# Patient Record
Sex: MALE | Race: BLACK OR AFRICAN AMERICAN | Employment: OTHER | URBAN - METROPOLITAN AREA
[De-identification: names, ages, dates, MRNs, and addresses within clinical notes are randomized per-mention and may not be internally consistent; named-entity substitution may affect disease eponyms.]

---

## 2023-02-21 ENCOUNTER — APPOINTMENT (OUTPATIENT)
Facility: HOSPITAL | Age: 73
DRG: 066 | End: 2023-02-21
Payer: MEDICARE

## 2023-02-21 ENCOUNTER — HOSPITAL ENCOUNTER (INPATIENT)
Facility: HOSPITAL | Age: 73
LOS: 1 days | Discharge: HOME HEALTH | DRG: 066 | End: 2023-02-23
Attending: EMERGENCY MEDICINE | Admitting: INTERNAL MEDICINE
Payer: MEDICARE

## 2023-02-21 DIAGNOSIS — G45.9 TIA (TRANSIENT ISCHEMIC ATTACK): Primary | ICD-10-CM

## 2023-02-21 LAB
ALBUMIN SERPL-MCNC: 3.8 G/DL (ref 3.4–5)
ALBUMIN/GLOB SERPL: 1.2 (ref 0.8–1.7)
ALP SERPL-CCNC: 63 U/L (ref 45–117)
ALT SERPL-CCNC: 23 U/L (ref 16–61)
ANION GAP SERPL CALC-SCNC: 4 MMOL/L (ref 3–18)
AST SERPL-CCNC: 19 U/L (ref 10–38)
BASOPHILS # BLD: 0.1 K/UL (ref 0–0.1)
BASOPHILS NFR BLD: 1 % (ref 0–2)
BILIRUB SERPL-MCNC: 0.9 MG/DL (ref 0.2–1)
BUN SERPL-MCNC: 19 MG/DL (ref 7–18)
BUN/CREAT SERPL: 13 (ref 12–20)
CALCIUM SERPL-MCNC: 9.4 MG/DL (ref 8.5–10.1)
CHLORIDE SERPL-SCNC: 111 MMOL/L (ref 100–111)
CHOLEST SERPL-MCNC: 267 MG/DL
CK SERPL-CCNC: 273 U/L (ref 39–308)
CO2 SERPL-SCNC: 26 MMOL/L (ref 21–32)
CREAT SERPL-MCNC: 1.45 MG/DL (ref 0.6–1.3)
DIFFERENTIAL METHOD BLD: NORMAL
ECHO AO ARCH DIAM: 3.3 CM
ECHO AO ASC DIAM: 3.8 CM
ECHO AO ASCENDING AORTA INDEX: 1.88 CM/M2
ECHO AO ROOT DIAM: 3.9 CM
ECHO AO ROOT INDEX: 1.93 CM/M2
ECHO AV AREA PEAK VELOCITY: 3.6 CM2
ECHO AV AREA VTI: 3.8 CM2
ECHO AV AREA/BSA PEAK VELOCITY: 1.8 CM2/M2
ECHO AV AREA/BSA VTI: 1.9 CM2/M2
ECHO AV MEAN GRADIENT: 2 MMHG
ECHO AV MEAN VELOCITY: 0.7 M/S
ECHO AV PEAK GRADIENT: 4 MMHG
ECHO AV PEAK VELOCITY: 1 M/S
ECHO AV VELOCITY RATIO: 0.9
ECHO AV VTI: 21.8 CM
ECHO BSA: 2.04 M2
ECHO EST RA PRESSURE: 3 MMHG
ECHO LA VOL 2C: 63 ML (ref 18–58)
ECHO LA VOL 2C: 68 ML (ref 18–58)
ECHO LA VOL 4C: 46 ML (ref 18–58)
ECHO LA VOL 4C: 53 ML (ref 18–58)
ECHO LA VOLUME AREA LENGTH: 66 ML
ECHO LA VOLUME INDEX AREA LENGTH: 33 ML/M2 (ref 16–34)
ECHO LV E' LATERAL VELOCITY: 9 CM/S
ECHO LV E' SEPTAL VELOCITY: 6 CM/S
ECHO LV EJECTION FRACTION A2C: 60 %
ECHO LV EJECTION FRACTION A4C: 65 %
ECHO LV FRACTIONAL SHORTENING: 34 % (ref 28–44)
ECHO LV INTERNAL DIMENSION DIASTOLE INDEX: 1.88 CM/M2
ECHO LV INTERNAL DIMENSION DIASTOLIC: 3.8 CM (ref 4.2–5.9)
ECHO LV INTERNAL DIMENSION SYSTOLIC INDEX: 1.24 CM/M2
ECHO LV INTERNAL DIMENSION SYSTOLIC: 2.5 CM
ECHO LV IVSD: 1.4 CM (ref 0.6–1)
ECHO LV MASS 2D: 143.8 G (ref 88–224)
ECHO LV MASS INDEX 2D: 71.2 G/M2 (ref 49–115)
ECHO LV POSTERIOR WALL DIASTOLIC: 0.9 CM (ref 0.6–1)
ECHO LV RELATIVE WALL THICKNESS RATIO: 0.47
ECHO LVOT AREA: 4.2 CM2
ECHO LVOT AV VTI INDEX: 0.88
ECHO LVOT DIAM: 2.3 CM
ECHO LVOT MEAN GRADIENT: 2 MMHG
ECHO LVOT PEAK GRADIENT: 3 MMHG
ECHO LVOT PEAK VELOCITY: 0.9 M/S
ECHO LVOT STROKE VOLUME INDEX: 39.3 ML/M2
ECHO LVOT SV: 79.3 ML
ECHO LVOT VTI: 19.1 CM
ECHO MV A VELOCITY: 0.76 M/S
ECHO MV E DECELERATION TIME (DT): 420.4 MS
ECHO MV E VELOCITY: 0.61 M/S
ECHO MV E/A RATIO: 0.8
ECHO MV E/E' LATERAL: 6.78
ECHO MV E/E' RATIO (AVERAGED): 8.47
ECHO MV E/E' SEPTAL: 10.17
ECHO PV MAX VELOCITY: 0.9 M/S
ECHO PV PEAK GRADIENT: 4 MMHG
ECHO RA AREA 4C: 14.8 CM2
ECHO RIGHT VENTRICULAR SYSTOLIC PRESSURE (RVSP): 21 MMHG
ECHO RV BASAL DIMENSION: 3.3 CM
ECHO RV FREE WALL PEAK S': 13 CM/S
ECHO RV TAPSE: 2.5 CM (ref 1.7–?)
ECHO TV REGURGITANT MAX VELOCITY: 2.14 M/S
ECHO TV REGURGITANT PEAK GRADIENT: 18 MMHG
EKG ATRIAL RATE: 71 BPM
EKG DIAGNOSIS: NORMAL
EKG P AXIS: 42 DEGREES
EKG P-R INTERVAL: 178 MS
EKG Q-T INTERVAL: 412 MS
EKG QRS DURATION: 86 MS
EKG QTC CALCULATION (BAZETT): 447 MS
EKG R AXIS: 4 DEGREES
EKG T AXIS: 10 DEGREES
EKG VENTRICULAR RATE: 71 BPM
EOSINOPHIL # BLD: 0.1 K/UL (ref 0–0.4)
EOSINOPHIL NFR BLD: 1 % (ref 0–5)
ERYTHROCYTE [DISTWIDTH] IN BLOOD BY AUTOMATED COUNT: 14.2 % (ref 11.6–14.5)
ERYTHROCYTE [SEDIMENTATION RATE] IN BLOOD: 5 MM/HR (ref 0–20)
EST. AVERAGE GLUCOSE BLD GHB EST-MCNC: 131 MG/DL
GLOBULIN SER CALC-MCNC: 3.2 G/DL (ref 2–4)
GLUCOSE BLD STRIP.AUTO-MCNC: 97 MG/DL (ref 70–110)
GLUCOSE SERPL-MCNC: 148 MG/DL (ref 74–99)
HBA1C MFR BLD: 6.2 % (ref 4.2–5.6)
HCT VFR BLD AUTO: 44 % (ref 36–48)
HDLC SERPL-MCNC: 64 MG/DL (ref 40–60)
HDLC SERPL: 4.2 (ref 0–5)
HGB BLD-MCNC: 14.1 G/DL (ref 13–16)
IMM GRANULOCYTES # BLD AUTO: 0 K/UL (ref 0–0.04)
IMM GRANULOCYTES NFR BLD AUTO: 0 % (ref 0–0.5)
INR PPP: 1 (ref 0.8–1.2)
LDLC SERPL CALC-MCNC: 181.2 MG/DL (ref 0–100)
LIPID PANEL: ABNORMAL
LV EF: 63 %
LVEF MODALITY: ABNORMAL
LYMPHOCYTES # BLD: 2.2 K/UL (ref 0.9–3.6)
LYMPHOCYTES NFR BLD: 25 % (ref 21–52)
MCH RBC QN AUTO: 27 PG (ref 24–34)
MCHC RBC AUTO-ENTMCNC: 32 G/DL (ref 31–37)
MCV RBC AUTO: 84.3 FL (ref 78–100)
MONOCYTES # BLD: 0.5 K/UL (ref 0.05–1.2)
MONOCYTES NFR BLD: 6 % (ref 3–10)
NEUTS SEG # BLD: 6.1 K/UL (ref 1.8–8)
NEUTS SEG NFR BLD: 67 % (ref 40–73)
NRBC # BLD: 0 K/UL (ref 0–0.01)
NRBC BLD-RTO: 0 PER 100 WBC
PLATELET # BLD AUTO: 246 K/UL (ref 135–420)
PMV BLD AUTO: 10.1 FL (ref 9.2–11.8)
POTASSIUM SERPL-SCNC: 4.1 MMOL/L (ref 3.5–5.5)
PROT SERPL-MCNC: 7 G/DL (ref 6.4–8.2)
PROTHROMBIN TIME: 13.1 SEC (ref 11.5–15.2)
RBC # BLD AUTO: 5.22 M/UL (ref 4.35–5.65)
SODIUM SERPL-SCNC: 141 MMOL/L (ref 136–145)
TRIGL SERPL-MCNC: 109 MG/DL
TROPONIN I SERPL HS-MCNC: 10 NG/L (ref 0–78)
TSH SERPL DL<=0.05 MIU/L-ACNC: 0.53 UIU/ML (ref 0.36–3.74)
VLDLC SERPL CALC-MCNC: 21.8 MG/DL
WBC # BLD AUTO: 9.1 K/UL (ref 4.6–13.2)

## 2023-02-21 PROCEDURE — 2580000003 HC RX 258: Performed by: INTERNAL MEDICINE

## 2023-02-21 PROCEDURE — 99285 EMERGENCY DEPT VISIT HI MDM: CPT

## 2023-02-21 PROCEDURE — 93306 TTE W/DOPPLER COMPLETE: CPT | Performed by: INTERNAL MEDICINE

## 2023-02-21 PROCEDURE — 85652 RBC SED RATE AUTOMATED: CPT

## 2023-02-21 PROCEDURE — 6360000004 HC RX CONTRAST MEDICATION: Performed by: EMERGENCY MEDICINE

## 2023-02-21 PROCEDURE — 6360000002 HC RX W HCPCS

## 2023-02-21 PROCEDURE — 70551 MRI BRAIN STEM W/O DYE: CPT

## 2023-02-21 PROCEDURE — 83036 HEMOGLOBIN GLYCOSYLATED A1C: CPT

## 2023-02-21 PROCEDURE — 99222 1ST HOSP IP/OBS MODERATE 55: CPT | Performed by: INTERNAL MEDICINE

## 2023-02-21 PROCEDURE — G0378 HOSPITAL OBSERVATION PER HR: HCPCS

## 2023-02-21 PROCEDURE — 96372 THER/PROPH/DIAG INJ SC/IM: CPT

## 2023-02-21 PROCEDURE — 2500000003 HC RX 250 WO HCPCS: Performed by: INTERNAL MEDICINE

## 2023-02-21 PROCEDURE — 82550 ASSAY OF CK (CPK): CPT

## 2023-02-21 PROCEDURE — 99221 1ST HOSP IP/OBS SF/LOW 40: CPT | Performed by: STUDENT IN AN ORGANIZED HEALTH CARE EDUCATION/TRAINING PROGRAM

## 2023-02-21 PROCEDURE — 84484 ASSAY OF TROPONIN QUANT: CPT

## 2023-02-21 PROCEDURE — 82962 GLUCOSE BLOOD TEST: CPT

## 2023-02-21 PROCEDURE — 94760 N-INVAS EAR/PLS OXIMETRY 1: CPT

## 2023-02-21 PROCEDURE — 1100000003 HC PRIVATE W/ TELEMETRY

## 2023-02-21 PROCEDURE — 84443 ASSAY THYROID STIM HORMONE: CPT

## 2023-02-21 PROCEDURE — 6370000000 HC RX 637 (ALT 250 FOR IP)

## 2023-02-21 PROCEDURE — 85610 PROTHROMBIN TIME: CPT

## 2023-02-21 PROCEDURE — 93005 ELECTROCARDIOGRAM TRACING: CPT

## 2023-02-21 PROCEDURE — 93306 TTE W/DOPPLER COMPLETE: CPT

## 2023-02-21 PROCEDURE — 85025 COMPLETE CBC W/AUTO DIFF WBC: CPT

## 2023-02-21 PROCEDURE — 6370000000 HC RX 637 (ALT 250 FOR IP): Performed by: STUDENT IN AN ORGANIZED HEALTH CARE EDUCATION/TRAINING PROGRAM

## 2023-02-21 PROCEDURE — 6360000002 HC RX W HCPCS: Performed by: INTERNAL MEDICINE

## 2023-02-21 PROCEDURE — 96374 THER/PROPH/DIAG INJ IV PUSH: CPT

## 2023-02-21 PROCEDURE — 70498 CT ANGIOGRAPHY NECK: CPT

## 2023-02-21 PROCEDURE — 80061 LIPID PANEL: CPT

## 2023-02-21 PROCEDURE — 70450 CT HEAD/BRAIN W/O DYE: CPT

## 2023-02-21 PROCEDURE — 76770 US EXAM ABDO BACK WALL COMP: CPT

## 2023-02-21 PROCEDURE — 6370000000 HC RX 637 (ALT 250 FOR IP): Performed by: INTERNAL MEDICINE

## 2023-02-21 PROCEDURE — 80053 COMPREHEN METABOLIC PANEL: CPT

## 2023-02-21 RX ORDER — ATORVASTATIN CALCIUM 40 MG/1
80 TABLET, FILM COATED ORAL NIGHTLY
Status: DISCONTINUED | OUTPATIENT
Start: 2023-02-21 | End: 2023-02-23 | Stop reason: HOSPADM

## 2023-02-21 RX ORDER — ASPIRIN 300 MG/1
300 SUPPOSITORY RECTAL DAILY
Status: DISCONTINUED | OUTPATIENT
Start: 2023-02-22 | End: 2023-02-21

## 2023-02-21 RX ORDER — ONDANSETRON 4 MG/1
4 TABLET, ORALLY DISINTEGRATING ORAL EVERY 8 HOURS PRN
Status: DISCONTINUED | OUTPATIENT
Start: 2023-02-21 | End: 2023-02-23 | Stop reason: HOSPADM

## 2023-02-21 RX ORDER — FAMOTIDINE 20 MG/1
20 TABLET, FILM COATED ORAL
Status: DISCONTINUED | OUTPATIENT
Start: 2023-02-21 | End: 2023-02-23 | Stop reason: HOSPADM

## 2023-02-21 RX ORDER — ESCITALOPRAM OXALATE 10 MG/1
TABLET ORAL
COMMUNITY
Start: 2022-12-12 | End: 2023-02-21

## 2023-02-21 RX ORDER — SODIUM CHLORIDE 9 MG/ML
INJECTION, SOLUTION INTRAVENOUS CONTINUOUS
Status: DISPENSED | OUTPATIENT
Start: 2023-02-21 | End: 2023-02-22

## 2023-02-21 RX ORDER — POLYETHYLENE GLYCOL 3350 17 G/17G
17 POWDER, FOR SOLUTION ORAL DAILY PRN
Status: DISCONTINUED | OUTPATIENT
Start: 2023-02-21 | End: 2023-02-23 | Stop reason: HOSPADM

## 2023-02-21 RX ORDER — ENOXAPARIN SODIUM 100 MG/ML
40 INJECTION SUBCUTANEOUS DAILY
Status: DISCONTINUED | OUTPATIENT
Start: 2023-02-21 | End: 2023-02-23 | Stop reason: HOSPADM

## 2023-02-21 RX ORDER — BUDESONIDE AND FORMOTEROL FUMARATE DIHYDRATE 160; 4.5 UG/1; UG/1
2 AEROSOL RESPIRATORY (INHALATION) 2 TIMES DAILY
COMMUNITY

## 2023-02-21 RX ORDER — BACTERIOSTATIC SODIUM CHLORIDE 0.9 %
10 VIAL (ML) INJECTION ONCE
Status: COMPLETED | OUTPATIENT
Start: 2023-02-21 | End: 2023-02-21

## 2023-02-21 RX ORDER — LABETALOL HYDROCHLORIDE 5 MG/ML
10 INJECTION, SOLUTION INTRAVENOUS EVERY 10 MIN PRN
Status: DISCONTINUED | OUTPATIENT
Start: 2023-02-21 | End: 2023-02-23 | Stop reason: HOSPADM

## 2023-02-21 RX ORDER — FLUTICASONE PROPIONATE AND SALMETEROL 250; 50 UG/1; UG/1
POWDER RESPIRATORY (INHALATION)
COMMUNITY
Start: 2021-12-07 | End: 2023-02-21

## 2023-02-21 RX ORDER — IPRATROPIUM BROMIDE AND ALBUTEROL SULFATE 2.5; .5 MG/3ML; MG/3ML
1 SOLUTION RESPIRATORY (INHALATION) EVERY 4 HOURS PRN
Status: DISCONTINUED | OUTPATIENT
Start: 2023-02-21 | End: 2023-02-23 | Stop reason: HOSPADM

## 2023-02-21 RX ORDER — ARFORMOTEROL TARTRATE 15 UG/2ML
15 SOLUTION RESPIRATORY (INHALATION) 2 TIMES DAILY
Status: DISCONTINUED | OUTPATIENT
Start: 2023-02-21 | End: 2023-02-23 | Stop reason: HOSPADM

## 2023-02-21 RX ORDER — ONDANSETRON 2 MG/ML
4 INJECTION INTRAMUSCULAR; INTRAVENOUS
Status: COMPLETED | OUTPATIENT
Start: 2023-02-21 | End: 2023-02-21

## 2023-02-21 RX ORDER — BUDESONIDE AND FORMOTEROL FUMARATE DIHYDRATE 160; 4.5 UG/1; UG/1
2 AEROSOL RESPIRATORY (INHALATION) 2 TIMES DAILY
Status: DISCONTINUED | OUTPATIENT
Start: 2023-02-21 | End: 2023-02-21

## 2023-02-21 RX ORDER — BUDESONIDE 0.5 MG/2ML
0.5 INHALANT ORAL 2 TIMES DAILY
Status: DISCONTINUED | OUTPATIENT
Start: 2023-02-21 | End: 2023-02-23 | Stop reason: HOSPADM

## 2023-02-21 RX ORDER — ASPIRIN 325 MG
325 TABLET ORAL
Status: COMPLETED | OUTPATIENT
Start: 2023-02-21 | End: 2023-02-21

## 2023-02-21 RX ORDER — ASPIRIN 81 MG/1
81 TABLET ORAL DAILY
Status: DISCONTINUED | OUTPATIENT
Start: 2023-02-22 | End: 2023-02-21

## 2023-02-21 RX ORDER — ASPIRIN 300 MG/1
300 SUPPOSITORY RECTAL DAILY
Status: DISCONTINUED | OUTPATIENT
Start: 2023-02-21 | End: 2023-02-21

## 2023-02-21 RX ORDER — TADALAFIL 20 MG/1
TABLET ORAL
COMMUNITY
Start: 2022-04-04 | End: 2023-02-21

## 2023-02-21 RX ORDER — LORAZEPAM 2 MG/ML
2 INJECTION INTRAMUSCULAR
Status: COMPLETED | OUTPATIENT
Start: 2023-02-21 | End: 2023-02-21

## 2023-02-21 RX ORDER — BUPROPION HYDROCHLORIDE 150 MG/1
TABLET, EXTENDED RELEASE ORAL
Status: ON HOLD | COMMUNITY
Start: 2023-01-19 | End: 2023-02-23 | Stop reason: HOSPADM

## 2023-02-21 RX ORDER — ONDANSETRON 2 MG/ML
4 INJECTION INTRAMUSCULAR; INTRAVENOUS EVERY 6 HOURS PRN
Status: DISCONTINUED | OUTPATIENT
Start: 2023-02-21 | End: 2023-02-23 | Stop reason: HOSPADM

## 2023-02-21 RX ORDER — ALBUTEROL SULFATE 90 UG/1
AEROSOL, METERED RESPIRATORY (INHALATION)
COMMUNITY

## 2023-02-21 RX ORDER — ASPIRIN 81 MG/1
81 TABLET ORAL DAILY
Status: DISCONTINUED | OUTPATIENT
Start: 2023-02-21 | End: 2023-02-21

## 2023-02-21 RX ORDER — ROSUVASTATIN CALCIUM 20 MG/1
TABLET, COATED ORAL
Status: ON HOLD | COMMUNITY
Start: 2022-04-04 | End: 2023-02-23 | Stop reason: HOSPADM

## 2023-02-21 RX ORDER — TRAZODONE HYDROCHLORIDE 50 MG/1
50 TABLET ORAL
COMMUNITY
Start: 2014-05-01 | End: 2023-02-21

## 2023-02-21 RX ADMIN — ONDANSETRON 4 MG: 2 INJECTION INTRAMUSCULAR; INTRAVENOUS at 10:35

## 2023-02-21 RX ADMIN — ASPIRIN 325 MG: 325 TABLET, COATED ORAL at 21:12

## 2023-02-21 RX ADMIN — FAMOTIDINE 20 MG: 20 TABLET ORAL at 21:12

## 2023-02-21 RX ADMIN — SODIUM CHLORIDE 10 ML: 9 INJECTION INTRAMUSCULAR; INTRAVENOUS; SUBCUTANEOUS at 13:36

## 2023-02-21 RX ADMIN — SODIUM CHLORIDE: 900 INJECTION, SOLUTION INTRAVENOUS at 13:53

## 2023-02-21 RX ADMIN — ATORVASTATIN CALCIUM 80 MG: 40 TABLET, FILM COATED ORAL at 21:12

## 2023-02-21 RX ADMIN — IOPAMIDOL 80 ML: 755 INJECTION, SOLUTION INTRAVENOUS at 10:49

## 2023-02-21 RX ADMIN — ENOXAPARIN SODIUM 40 MG: 100 INJECTION SUBCUTANEOUS at 13:53

## 2023-02-21 RX ADMIN — ASPIRIN 325 MG: 325 TABLET, FILM COATED ORAL at 11:42

## 2023-02-21 RX ADMIN — LORAZEPAM 2 MG: 2 INJECTION INTRAMUSCULAR; INTRAVENOUS at 21:12

## 2023-02-21 ASSESSMENT — ENCOUNTER SYMPTOMS
VOMITING: 1
ABDOMINAL PAIN: 0
NAUSEA: 1

## 2023-02-21 ASSESSMENT — PAIN - FUNCTIONAL ASSESSMENT: PAIN_FUNCTIONAL_ASSESSMENT: NONE - DENIES PAIN

## 2023-02-21 NOTE — ED NOTES
Departed the ED to CT department, via stretcher, accompanied by EDT, in stable condition.       Michael Borges, RN  02/21/23 [address], RN  02/21/23 4782

## 2023-02-21 NOTE — H&P
History & Physical    Patient: Baron Curran MRN: [id number]  CSN: [id number]    YOB: 1950  Age: 74 y.o. Sex: male      DOA: 2/21/2023  CC: dizziness, nausea/vomiting     PCP: None Provider       HPI:     Baron Curran is a 74 y.o. male with medical co-morbidities including HTN, persistent Asthma, PTSD, h/o stroke, presented from home with persistent nausea and dizziness with standing. He reported that his right vision gave out around 1 AM while watching TV. He had no headache but felt dizzy and \"wobbly\" on his feet. He reported nausea and vomited which relieved his symptom. He went to bed but when he woke up this AM, he continue to experience nausea and dizziness. He reported that his gait was not usual. No report of fall. He reported that his dizziness was not associate with positional.  He reported that he could not speak correctly and seemed to slur in his speech. He reported of \"speech impairment\" but this has been corrected with training. He acknowledged that his current speech is not normal.     In the ER, Code stroke was initiated. CBC was normal. CMP was normal. CT head was negative for acute pathology. He had h/o age-indeterminate lacunar infarct at the left cerebellum. CTA head was without evidence of occlusion. CTA neck without significant stenosis. Right thyroid nodule 1.5cm. right carotid enhancing nodule. Review of Systems  GENERAL: No fever, No chill, + malaise   HEENT: +change in right vision, no ear ache, no sore throat or sinus congestion. NECK: No pain or stiffness. PULMONARY: No shortness of breath, no cough or wheeze. Cardiovascular: no pnd / orthopnea, no Chest Pain  GASTROINTESTINAL: No abd pain, No nausea/vomiting, No diarrhea, No melena or bright red blood per rectum. GENITOURINARY: No urinary frequency, No urgency or pain with urination. MUSCULOSKELETAL: No joint or muscle pain, no back pain, no recent trauma.    DERMATOLOGIC: No rash, no itching, no lesions. ENDOCRINE: No polyuria, polydipsia,  No recent change in weight. HEMATOLOGICAL: No easy bruising or bleeding. NEUROLOGIC: No headache, No seizures, +dizziness, +unstable gait          Past Medical History:   Diagnosis Date    Cerebral artery occlusion with cerebral infarction [facility])     H/O prostate cancer     Hyperlipidemia     Moderate persistent asthma     Nephrolithiasis     PTSD (post-traumatic stress disorder)        Past Surgical History:   Procedure Laterality Date    ENDOVASCULAR REPAIR PERIPHERAL ANEURYSM Right     FEMORAL ANEURYSM REPAIR Right    HERNIA REPAIR      PROSTATE SURGERY         Family History   Problem Relation Age of Onset    Dementia Mother     Prostate Cancer Father        Social History     Socioeconomic History    Marital status:      Spouse name: None    Number of children: None    Years of education: None    Highest education level: None   Tobacco Use    Smoking status: Never    Smokeless tobacco: Never       Prior to Admission medications    Medication Sig Start Date End Date Taking? Authorizing Provider   buPROPion (WELLBUTRIN SR) 150 MG extended release tablet TAKE 1 TABLET BY MOUTH EVERY 12 HOURS. TAKE JUST IN THE MORNING FOR THE FIRST WEEK TO ENSURE NO SIDE EFFECTS 1/19/23  Yes Historical Provider, MD   albuterol sulfate HFA (PROVENTIL;VENTOLIN;PROAIR) 108 (90 Base) MCG/ACT inhaler ProAir HFA 90 mcg/actuation aerosol inhaler   Inhale 2 puffs every 4 hours by inhalation route as needed. Yes Historical Provider, MD   budesonide-formoterol (SYMBICORT) 160-4.5 MCG/ACT AERO Inhale 2 puffs into the lungs 2 times daily   Yes Historical Provider, MD   rosuvastatin (CRESTOR) 20 MG tablet rosuvastatin 20 mg tablet   Take 1 tablet every day by oral route.  4/4/22   Historical Provider, MD       Allergies   Allergen Reactions    Dulera [Mometasone Furo-Formoterol Fum] Cough              Physical Exam:      Visit Vitals  BP (!) 154/93   Pulse 63   Temp 98 °F (36.7 °C) (Oral)   Resp 14   Ht 5' 9\" (1.753 m)   Wt 189 lb (85.7 kg)   SpO2 97%   BMI 27.91 kg/m²       Physical Exam:  Tele: sinus   General:  Cooperative, Not in acute distress, speaks in short sentence while in bed, slurred speech pattern   HEENT: PERRL, EOMI, supple neck, no JVD, dry oral mucosa  Cardiovascular: S1S2 regular, no rub/gallop   Pulmonary: + air entry bilaterally, no wheezing, no crackle  GI:  Soft, non tender, non distended, +bs, no guarding   Extremities:  No pedal edema, +distal pulses appreciated   Neuro: AOx3, moving all extremities, no gross deficit. Finger to nose intact. Lower reflex intact intact     Lab/Data Review:  Labs: Results:       Chemistry Recent Labs     02/21/23  1015      K 4.1      CO2 26   BUN 19*   GLOB 3.2      CBC w/Diff Recent Labs     02/21/23  1015   WBC 9.1   RBC 5.22   HGB 14.1   HCT 44.0         Coagulation Recent Labs     02/21/23  1015   INR 1.0       Iron/Ferritin    BNP    Cardiac Enzymes    Liver Enzymes    Thyroid Studies          Imaging Reviewed:  CT Result (most recent):  CTA HEAD NECK W CONTRAST 02/21/2023    Narrative  EXAM: CTA HEAD NECK W CONTRAST    CLINICAL INDICATIONS: Stroke rule out LVA  Slurred speech and weakness    TECHNIQUE: Helical CT scan of the brain and neck were performed during rapid IV  bolus contrast administration. These data were reconstructed at .625 mm  intervals for vascular analysis. The data was also reviewed at 2.5 mm intervals  for accompanying soft tissue analysis. 3D post processed images, including  surface shaded displays, were produced for this exam on independent console,  permanently archived and interpreted.     All CT scans at this facility are performed using dose optimization technique as  appropriate to a performed exam, to include automated exposure control,  adjustment of the MA and/or kV according to patient size (including appropriate  matching for site-specific examinations) or use of iterative reconstruction  technique. COMPARISON: Concurrent CT head    CTA NECK VASCULAR ANALYSIS:    Aortic arch: Left common carotid artery originates from the brachiocephalic. Proximal branch vessels are within normal limits. Right carotid:  -CCA: Patent  -ECA: Origin is patent  -ICA: Patent. 0% stenosis of proximal ICA by NASCET criteria. Left carotid:  -CCA: Patent  -ECA: Origin is patent  -ICA: Patent. 0% stenosis of proximal ICA by NASCET criteria. Right vertebral: Patent  Left vertebral: Patent      CTA HEAD VASCULAR ANALYSIS:    Anterior circulation:  -ICA: Patent bilaterally with mild atherosclerotic calcification. -MARIE: Patent  -MCA: Patent  -AComm: Diminutive but patent. -PComm: Hypoplastic bilaterally. Posterior circulation:  -Vertebral: Patent  -Basilar: Patent  -Superior cerebellar: Patent  -PCA: Patent    Major dural venous sinuses: Unremarkable    CTA SOFT TISSUE ANALYSIS:  Lungs: Clear  Upper chest: Unremarkable  Neck: 1.5 x 1.2 cm hypodense right thyroid nodule. 1.6 x 0.8 cm enhancing nodule  left carotid. Lymph nodes: No adenopathy  Orbits: Unremarkable  Paranasal sinuses: Mild mucosal thickening left maxillary sinus. Brain: No hemorrhage or mass effect. Bones: Degenerative disc disease without critical central canal stenosis. Impression  CTA HEAD:  Patent intracranial vasculature without evidence of occlusion. CTA NECK:  1. Patent cervical carotid and vertebral arteries without hemodynamically  significant stenosis. 2.  Right thyroid nodule measuring up to 1.5 cm, consider nonemergent outpatient  follow-up ultrasound. 3.  Right carotid enhancing nodule, may represent primary parotid lesion  including pleomorphic adenoma. Prelim results called to [address], 1103 a.m. Assessment:   Principal Problem:    TIA (transient ischemic attack)    Acute dizziness, nausea/vomiting, unstable gait, Highly Concerning for Posterior CVA.  H/O of lacunar infarct at the left cerebellum   H/o CVA with reported dysarthria, per patient report   Hypertension, patient has not been taking his antihypertensive medication  Hyperlipidemia, he has not been taking his rosuvastatin   Moderate persistent asthma, no exacerbation at this time   PTSD  H/o prostate cancer and prostate surgery per patient report   CKD3, with last creatinine 1.4   NOTE: right thyroid nodule 1.5cm   Right parotid lesion as seen on CT, needs OP workup       Plan:     Admitted to telemetry for close monitoring per AHA guideline for acute CVA concern. CVA order set placed. Allow for permissive hypertension for now. Can use labetalol prn for SBP>220, DBP>120. Start ASA and high-intensity Atorvastatin. Check Echo, MRI brain, check HgbA1c, Lipid panel, TSH, ESR. PT/OT/SPT consulted   Add antiemetic prn  Add NS at 100cc/hr for 24 hrs. Can advance diet if he passed bedside swallowing. Resume his Symbicort Rx, resume bronchodilator prn   He can be resumed on his Buprobion at lower dose. Check US renal   He needs outpt US thyroid for further care   Neurology consult, notified. Spoke with his wife at bedside with clinical update and care plan. He asked for anxiety medication for MRI brain, ordered IV ativan x1.          Risk of deterioration:  []Low    [x]Moderate  []High     Prophylaxis:  [x]Lovenox  []Coumadin  []Hep SQ  []SCDs  [x]H2B/PPI     Disposition:  []Home w/ Family   [x] PT,OT,RN   []SNF/LTC   []SAH/Rehab     Discussed Code Status:         [x]Full Code      []DNR         ___________________________________________________     Care Plan discussed with:    [x]Patient   [x]Family    []ED Care Manager  []ED Doc   [x]Specialist :  Total Time Coordinating Admission:  60    minutes    []Total Critical Care Time:       Hoda Robertson MD  2/21/2023, 3:09 PM

## 2023-02-21 NOTE — PROGRESS NOTES
Speech Pathology: Will hold off on swallow eval this date as pt demo emesis event this AM. Please complete swallow screen and initiate diet as appropriate.     Thank you for this referral.    Paris Beltran M.S., CCC-SLP/L  Speech-Language Pathologist

## 2023-02-21 NOTE — ED PROVIDER NOTES
EMERGENCY DEPARTMENT HISTORY AND PHYSICAL EXAM        Date: 2/21/2023  Patient Name: Ebony Dorantes. History of Presenting Illness     Chief Complaint   Patient presents with    Extremity Weakness       History Provided By: Patient     HPI: Ebony Dorantes., 74 y.o. male presented for code stroke via EMS. Patient states that around 1 AM last night he had a blackout of his vision on the right-hand side, tried to get up with bilateral lower extremity weakness, dizziness, nausea and vomiting, and slurred speech. During EMS, transport patient had no stroke symptoms and had an NIH stroke scale of 0. Patient's initial blood sugar was 120 and blood pressure was 160/90. Patient denied taking any blood thinner. Patient states that he has had a prior stroke at the age of 3. On arrival, patient was complaining of nausea and dizziness. There are no other complaints, changes, or physical findings at this time. PCP: None Provider    No current facility-administered medications on file prior to encounter. No current outpatient medications on file prior to encounter. Past History     Past Medical History:  No past medical history on file. Past Surgical History:  No past surgical history on file. Family History:  No family history on file. Social History:  Social History     Tobacco Use    Smoking status: Never    Smokeless tobacco: Never       Allergies:  No Known Allergies      Review of Systems   Review of Systems   Constitutional:  Negative for chills and fever. Gastrointestinal:  Positive for nausea and vomiting. Negative for abdominal pain. Musculoskeletal:  Negative for neck stiffness. Neurological:  Positive for dizziness, speech difficulty, weakness and light-headedness. All other systems reviewed and are negative. Physical Exam   Physical Exam  Vitals and nursing note reviewed. Constitutional:       General: He is not in acute distress. Appearance: Normal appearance. He is not ill-appearing, toxic-appearing or diaphoretic. HENT:      Head: Normocephalic and atraumatic. Cardiovascular:      Rate and Rhythm: Normal rate and regular rhythm. Pulmonary:      Effort: Pulmonary effort is normal.      Breath sounds: Normal breath sounds. Abdominal:      General: Abdomen is flat. Palpations: Abdomen is soft. Comments: Patient had abdomen is soft nontender and nondistended, with no reproducible pain on palpation. Musculoskeletal:         General: Normal range of motion. Cervical back: Normal range of motion and neck supple. Skin:     General: Skin is warm. Findings: No rash. Neurological:      General: No focal deficit present. Mental Status: He is alert and oriented to person, place, and time. Cranial Nerves: No cranial nerve deficit. Sensory: No sensory deficit. Motor: No weakness. Comments: Patient has an NIH stroke scale of 0, finger-to-nose test was negative, patient has no signs of limb ataxia, and visual field is intact.   Patient is alert and oriented x3       Diagnostic Study Results     Labs -     Recent Results (from the past 12 hour(s))   CBC with Auto Differential    Collection Time: 02/21/23 10:15 AM   Result Value Ref Range    WBC 9.1 4.6 - 13.2 K/uL    RBC 5.22 4.35 - 5.65 M/uL    Hemoglobin 14.1 13.0 - 16.0 g/dL    Hematocrit 44.0 36.0 - 48.0 %    MCV 84.3 78.0 - 100.0 FL    MCH 27.0 24.0 - 34.0 PG    MCHC 32.0 31.0 - 37.0 g/dL    RDW 14.2 11.6 - 14.5 %    Platelets 692 795 - 283 K/uL    MPV 10.1 9.2 - 11.8 FL    Nucleated RBCs 0.0 0  WBC    nRBC 0.00 0.00 - 0.01 K/uL    Seg Neutrophils 67 40 - 73 %    Lymphocytes 25 21 - 52 %    Monocytes 6 3 - 10 %    Eosinophils % 1 0 - 5 %    Basophils 1 0 - 2 %    Immature Granulocytes 0 0.0 - 0.5 %    Segs Absolute 6.1 1.8 - 8.0 K/UL    Absolute Lymph # 2.2 0.9 - 3.6 K/UL    Absolute Mono # 0.5 0.05 - 1.2 K/UL    Absolute Eos # 0.1 0.0 - 0.4 K/UL    Basophils Absolute 0.1 0.0 - 0.1 K/UL    Absolute Immature Granulocyte 0.0 0.00 - 0.04 K/UL    Differential Type AUTOMATED     Comprehensive Metabolic Panel    Collection Time: 02/21/23 10:15 AM   Result Value Ref Range    Sodium 141 136 - 145 mmol/L    Potassium 4.1 3.5 - 5.5 mmol/L    Chloride 111 100 - 111 mmol/L    CO2 26 21 - 32 mmol/L    Anion Gap 4 3.0 - 18 mmol/L    Glucose 148 (H) 74 - 99 mg/dL    BUN 19 (H) 7.0 - 18 MG/DL    Creatinine 1.45 (H) 0.6 - 1.3 MG/DL    Bun/Cre Ratio 13 12 - 20      Est, Glom Filt Rate 51 (L) >60 ml/min/1.73m2    Calcium 9.4 8.5 - 10.1 MG/DL    Total Bilirubin 0.9 0.2 - 1.0 MG/DL    ALT 23 16 - 61 U/L    AST 19 10 - 38 U/L    Alk Phosphatase 63 45 - 117 U/L    Total Protein 7.0 6.4 - 8.2 g/dL    Albumin 3.8 3.4 - 5.0 g/dL    Globulin 3.2 2.0 - 4.0 g/dL    Albumin/Globulin Ratio 1.2 0.8 - 1.7     Protime-INR    Collection Time: 02/21/23 10:15 AM   Result Value Ref Range    Protime 13.1 11.5 - 15.2 sec    INR 1.0 0.8 - 1.2     Troponin    Collection Time: 02/21/23 10:15 AM   Result Value Ref Range    Troponin, High Sensitivity 10 0 - 78 ng/L   EKG 12 Lead    Collection Time: 02/21/23 10:54 AM   Result Value Ref Range    Ventricular Rate 71 BPM    Atrial Rate 71 BPM    P-R Interval 178 ms    QRS Duration 86 ms    Q-T Interval 412 ms    QTc Calculation (Bazett) 447 ms    P Axis 42 degrees    R Axis 4 degrees    T Axis 10 degrees    Diagnosis Sinus rhythm with premature atrial complexes        Radiologic Studies -   CTA HEAD NECK W CONTRAST         CT HEAD WO CONTRAST   Final Result   No acute hemorrhage. Age-indeterminate small lacunar infarct at the left cerebellum. Chronic microvascular disease. Small right lateral scalp subcutaneous nodule or complex cyst.      Called to Dr. Ortega Antonio 2/21/2023 at 10:16 AM      MRI BRAIN [address]    (Results Pending)   [address] [facility] 157    (Results Pending)       Medical Decision Making   I am the first provider for this patient.     I reviewed the vital signs, available nursing notes, past medical history, past surgical history, family history and social history. Vital Signs-Reviewed the patient's vital signs. Vitals:    02/21/23 1245   BP: (!) 177/97   Pulse:    Resp:    Temp:    SpO2:            EKG interpretation: (Preliminary)  Normal sinus rhythm with a rate of 71 beats per minutes with premature atrial complexes. No Previous EKG to compare to. Records Reviewed: Past medical records. Provider Notes (Medical Decision Making):   49-year-old male with history of previous stroke at the age of 3 presented via EMS with concerns of a stroke. Patient on arrival last well-known was 1 AM which was outside of tPA window. On arrival, patient had no focal deficits, complaining of nausea and dizziness. Initial CT of the head was negative for any acute findings. There was a 1 cm cerebellar infarct, patient states that is from his previous stroke and they have been monitoring it at the [place]. CTA of the head and neck was was negative for large vessel occlusion. Tele-neurologist, ,  recommended MRI and inpatient evaluation. During patient's stay he was hemodynamically stable, and neurologically intact. Patient did have an episode of emesis which was relieved with 4 mg of Zofran IV. Patient had no abdominal tenderness on examination with no leukocytosis CBC. ED Course:   Initial assessment performed. The patients presenting problems have been discussed, and they are in agreement with the care plan formulated and outlined with them. I have encouraged them to ask questions as they arise throughout their visit. ED Course as of 02/21/23 1135   Tue Feb 21, 2023   0950 Code stroke was called patient's last known well was 1 AM where he stated he felt blurred vision, dizziness, slurred speech, and nausea/vomiting. Patient was alert and oriented on stretcher with no focal deficits and an NIH stroke scale of 0 at that time.   Patient was immediately sent to CT and tele neurology was consulted. [CD]   [phone] with Dr. Piedad Wilkerson and the teleneurologist who recommended 325 mg of aspirin and a CT of the head and neck. He stated that the CT of the head was negative besides chronic changes. His concern at this time is for posterior stroke he will evaluate patient and make further recommendations. [CD]   [phone] with Radiologist. No acute hemorrhage, small left Cerebellar infarct- less than 1cm,  [CD]   1105 CTA of the head and neck was negative for large vessel occlusion. Patient states that he has had this small [facility] [CD[facility]   200 Dr. Piedad Wilkerson recommended MRI and further evaluation of inpatient. Eval[CD]      ED Course User Index  [CD] Jameel Sim PA-C           Disposition:  Admission. 2.  Diagnosis     Clinical Impression:   1. TIA (transient ischemic attack)        Attestations:    Danny Bruce PA-C    Please note that this dictation was completed with [employer], the computer voice recognition software. Quite often unanticipated grammatical, syntax, homophones, and other interpretive errors are inadvertently transcribed by the computer software. Please disregard these errors. Please excuse any errors that have escaped final proofreading. Thank you.          Danny Bruce PA-C  02/21/23 6643

## 2023-02-21 NOTE — ED NOTES
Report called to nurse on 4S . Awt transport to floor.       [facility]  02/21/23 1537

## 2023-02-21 NOTE — ED NOTES
Returned from [address] [place] [address], via stretcher, in stable condition.       Tien Springer RN  02/21/23 3502

## 2023-02-21 NOTE — PROGRESS NOTES
TRANSFER - IN REPORT:    Verbal report received from Marvin Winkler RN on Tory Collier.  being received from ER for routine progression of patient care      Report consisted of patient's Situation, Background, Assessment and   Recommendations(SBAR). Information from the following report(s) Nurse Handoff Report, ED SBAR, Intake/Output, MAR, and Recent Results was reviewed with the receiving nurse. Opportunity for questions and clarification was provided. Assessment completed upon patient's arrival to unit and care assumed. 1915-  Bedside and Verbal shift change report given to CORY Olvera (oncoming nurse) by Tricia Seymour RN (offgoing nurse). Report included the following information Nurse Handoff Report, ED SBAR, Intake/Output, MAR, and Recent Results.

## 2023-02-21 NOTE — ED NOTES
C/O nausea, vomited approximately 100 ml of bile. Provider notified; will order a dose of antiemetic.        Ca Orantes RN  02/21/23 2062

## 2023-02-21 NOTE — PROGRESS NOTES
MRI screening form needs to be filled out and faxed to [phone] BEFORE MRI can be scheduled. If unable to obtain information from patient , [facility] needs to be contacted .  If patient is claustrophobic or will needs pain meds, please have ordered in advance in order to facilitate exam.

## 2023-02-21 NOTE — ED NOTES
Patient is currently in [address] [facility]. Will assess upon his return to the ED.      Michael Borges RN  02/21/23 8211

## 2023-02-22 PROBLEM — I63.9 ACUTE CVA (CEREBROVASCULAR ACCIDENT) (HCC): Status: ACTIVE | Noted: 2023-02-22

## 2023-02-22 PROBLEM — I63.50 RIGHT PONTINE STROKE (HCC): Status: ACTIVE | Noted: 2023-02-22

## 2023-02-22 LAB
AMPHET UR QL SCN: NEGATIVE
APPEARANCE UR: CLEAR
BACTERIA URNS QL MICRO: ABNORMAL /HPF
BARBITURATES UR QL SCN: NEGATIVE
BENZODIAZ UR QL: NEGATIVE
BILIRUB UR QL: NEGATIVE
CANNABINOIDS UR QL SCN: NEGATIVE
COCAINE UR QL SCN: NEGATIVE
COLOR UR: YELLOW
CREAT UR-MCNC: 184 MG/DL (ref 30–125)
EPITH CASTS URNS QL MICRO: ABNORMAL /LPF (ref 0–5)
ERYTHROCYTE [DISTWIDTH] IN BLOOD BY AUTOMATED COUNT: 14.4 % (ref 11.6–14.5)
GLUCOSE UR STRIP.AUTO-MCNC: NEGATIVE MG/DL
HCT VFR BLD AUTO: 42.6 % (ref 36–48)
HGB BLD-MCNC: 13.3 G/DL (ref 13–16)
HGB UR QL STRIP: NEGATIVE
KETONES UR QL STRIP.AUTO: NEGATIVE MG/DL
LEUKOCYTE ESTERASE UR QL STRIP.AUTO: ABNORMAL
Lab: NORMAL
MCH RBC QN AUTO: 26.9 PG (ref 24–34)
MCHC RBC AUTO-ENTMCNC: 31.2 G/DL (ref 31–37)
MCV RBC AUTO: 86.1 FL (ref 78–100)
METHADONE UR QL: NEGATIVE
NITRITE UR QL STRIP.AUTO: NEGATIVE
NRBC # BLD: 0 K/UL (ref 0–0.01)
NRBC BLD-RTO: 0 PER 100 WBC
OPIATES UR QL: NEGATIVE
PCP UR QL: NEGATIVE
PH UR STRIP: 5.5 (ref 5–8)
PLATELET # BLD AUTO: 239 K/UL (ref 135–420)
PMV BLD AUTO: 10.6 FL (ref 9.2–11.8)
PROT UR STRIP-MCNC: NEGATIVE MG/DL
RBC # BLD AUTO: 4.95 M/UL (ref 4.35–5.65)
RBC #/AREA URNS HPF: ABNORMAL /HPF (ref 0–5)
SODIUM UR-SCNC: 66 MMOL/L (ref 20–110)
SP GR UR REFRACTOMETRY: 1.02 (ref 1–1.03)
UROBILINOGEN UR QL STRIP.AUTO: 0.2 EU/DL (ref 0.2–1)
WBC # BLD AUTO: 6.8 K/UL (ref 4.6–13.2)
WBC URNS QL MICRO: ABNORMAL /HPF (ref 0–4)

## 2023-02-22 PROCEDURE — 1100000000 HC RM PRIVATE

## 2023-02-22 PROCEDURE — 97530 THERAPEUTIC ACTIVITIES: CPT

## 2023-02-22 PROCEDURE — 81001 URINALYSIS AUTO W/SCOPE: CPT

## 2023-02-22 PROCEDURE — 97116 GAIT TRAINING THERAPY: CPT

## 2023-02-22 PROCEDURE — 92522 EVALUATE SPEECH PRODUCTION: CPT

## 2023-02-22 PROCEDURE — 80307 DRUG TEST PRSMV CHEM ANLYZR: CPT

## 2023-02-22 PROCEDURE — 36415 COLL VENOUS BLD VENIPUNCTURE: CPT

## 2023-02-22 PROCEDURE — 97535 SELF CARE MNGMENT TRAINING: CPT

## 2023-02-22 PROCEDURE — 94640 AIRWAY INHALATION TREATMENT: CPT

## 2023-02-22 PROCEDURE — 94761 N-INVAS EAR/PLS OXIMETRY MLT: CPT

## 2023-02-22 PROCEDURE — 84300 ASSAY OF URINE SODIUM: CPT

## 2023-02-22 PROCEDURE — 85027 COMPLETE CBC AUTOMATED: CPT

## 2023-02-22 PROCEDURE — 6360000002 HC RX W HCPCS: Performed by: INTERNAL MEDICINE

## 2023-02-22 PROCEDURE — 97165 OT EVAL LOW COMPLEX 30 MIN: CPT

## 2023-02-22 PROCEDURE — G0378 HOSPITAL OBSERVATION PER HR: HCPCS

## 2023-02-22 PROCEDURE — 6370000000 HC RX 637 (ALT 250 FOR IP): Performed by: INTERNAL MEDICINE

## 2023-02-22 PROCEDURE — 92507 TX SP LANG VOICE COMM INDIV: CPT

## 2023-02-22 PROCEDURE — 99232 SBSQ HOSP IP/OBS MODERATE 35: CPT | Performed by: STUDENT IN AN ORGANIZED HEALTH CARE EDUCATION/TRAINING PROGRAM

## 2023-02-22 PROCEDURE — 97162 PT EVAL MOD COMPLEX 30 MIN: CPT

## 2023-02-22 PROCEDURE — 6370000000 HC RX 637 (ALT 250 FOR IP): Performed by: STUDENT IN AN ORGANIZED HEALTH CARE EDUCATION/TRAINING PROGRAM

## 2023-02-22 PROCEDURE — 82570 ASSAY OF URINE CREATININE: CPT

## 2023-02-22 RX ORDER — ASPIRIN 81 MG/1
81 TABLET ORAL DAILY
Status: DISCONTINUED | OUTPATIENT
Start: 2023-02-23 | End: 2023-02-23

## 2023-02-22 RX ORDER — CLOPIDOGREL BISULFATE 75 MG/1
75 TABLET ORAL DAILY
Status: DISCONTINUED | OUTPATIENT
Start: 2023-02-22 | End: 2023-02-22

## 2023-02-22 RX ADMIN — ATORVASTATIN CALCIUM 80 MG: 40 TABLET, FILM COATED ORAL at 21:56

## 2023-02-22 RX ADMIN — ASPIRIN 325 MG: 325 TABLET, COATED ORAL at 08:32

## 2023-02-22 RX ADMIN — BUDESONIDE 500 MCG: 0.5 INHALANT RESPIRATORY (INHALATION) at 08:33

## 2023-02-22 RX ADMIN — ENOXAPARIN SODIUM 40 MG: 100 INJECTION SUBCUTANEOUS at 08:32

## 2023-02-22 RX ADMIN — ARFORMOTEROL TARTRATE 15 MCG: 15 SOLUTION RESPIRATORY (INHALATION) at 08:33

## 2023-02-22 RX ADMIN — FAMOTIDINE 20 MG: 20 TABLET ORAL at 21:56

## 2023-02-22 RX ADMIN — BUDESONIDE 500 MCG: 0.5 INHALANT RESPIRATORY (INHALATION) at 21:55

## 2023-02-22 RX ADMIN — ARFORMOTEROL TARTRATE 15 MCG: 15 SOLUTION RESPIRATORY (INHALATION) at 21:55

## 2023-02-22 NOTE — CONSULTS
A 74years old male patient with medical history of hypertension, asthma, stroke at a young age Gwendlyn Bernheim he was in grade 4; which presented with right sided body weakness] was admitted for dizziness, nausea, and vomiting. At around 1 AM, patient developed blurring of vision on the right side and felt that there is something in his right eye. The blurring of vision subsided in about 10 minutes. Subsequently, he feels nauseous and had repeated vomiting. When he was trying to walk, felt unsteady. He claims his legs felt weak. Has lightheadedness. Has some changes in his speech with slurring. No changes in his mental status. During my evaluation this afternoon, symptoms are better but still feels unsteady when trying to walk. CT of the brain did not show any acute changes; it has shown age-indeterminate small lacunar infarction of the left cerebellum. CT angiography of the head and neck showed patent intra and extracranial circulation. A 1.5 cm thyroid nodule was seen. His lipid panel has shown significant elevated LDL [181.2]. His hemoglobin A1c is 6.2.     Social History     Socioeconomic History    Marital status:      Spouse name: Not on file    Number of children: Not on file    Years of education: Not on file    Highest education level: Not on file   Occupational History    Not on file   Tobacco Use    Smoking status: Never    Smokeless tobacco: Not on file   Substance and Sexual Activity    Alcohol use: Not on file    Drug use: Not on file    Sexual activity: Not on file   Other Topics Concern    Not on file   Social History Narrative    Not on file     Social Determinants of Health     Financial Resource Strain: Not on file   Food Insecurity: Not on file   Transportation Needs: Not on file   Physical Activity: Not on file   Stress: Not on file   Social Connections: Not on file   Intimate Partner Violence: Not on file   Housing Stability: Not on file       Family History   Problem Relation Age of Onset    Dementia Mother     Prostate Cancer Father         Current Facility-Administered Medications   Medication Dose Route Frequency Provider Last Rate Last Admin    ondansetron (ZOFRAN-ODT) disintegrating tablet 4 mg  4 mg Oral Q8H PRN Karen Flores MD        Or    ondansetron (ZOFRAN) injection 4 mg  4 mg IntraVENous Q6H PRN Karen Flores MD        polyethylene glycol (GLYCOLAX) packet 17 g  17 g Oral Daily PRN Debbie Washburn MD        enoxaparin (LOVENOX) injection 40 mg  40 mg SubCUTAneous Daily Debbie Washburn MD   40 mg at 02/21/23 1353    0.9 % sodium chloride infusion   IntraVENous Continuous Karen Flores  mL/hr at 02/21/23 1353 New Bag at 02/21/23 1353    atorvastatin (LIPITOR) tablet 80 mg  80 mg Oral Nightly Debbie Washburn MD        labetalol (NORMODYNE;TRANDATE) injection 10 mg  10 mg IntraVENous Q10 Min PRN MD Stef Licearique Grumbling ON 2/22/2023] aspirin EC tablet 81 mg  81 mg Oral Daily Debbie Washburn MD        Or    Ronald Grumbling ON 2/22/2023] aspirin suppository 300 mg  300 mg Rectal Daily Karen Flores MD        Ronald Grumbling ON 2/22/2023] LORazepam (ATIVAN) injection 2 mg  2 mg IntraVENous On Call Debbie Washburn MD        famotidine (PEPCID) tablet 20 mg  20 mg Oral QHS Karen Flores MD        ipratropium-albuterol (DUONEB) nebulizer solution 1 ampule  1 ampule Inhalation Q4H PRN Karen Flores MD        budesonide (PULMICORT) nebulizer suspension 500 mcg  0.5 mg Nebulization BID Debbie Washburn MD        arformoterol tartrate (BROVANA) nebulizer solution 15 mcg  15 mcg Nebulization BID Debbie Washburn MD           Past Medical History:   Diagnosis Date    Cerebral artery occlusion with cerebral infarction [facility])     H/O prostate cancer     Hyperlipidemia     Moderate persistent asthma     Nephrolithiasis     PTSD (post-traumatic stress disorder)        Past Surgical History:   Procedure Laterality Date    ENDOVASCULAR REPAIR PERIPHERAL ANEURYSM Right     FEMORAL ANEURYSM REPAIR Right    HERNIA REPAIR      PROSTATE SURGERY Allergies   Allergen Reactions    Dulera [Mometasone Furo-Formoterol Fum] Cough       Patient Active Problem List   Diagnosis    TIA (transient ischemic attack)         Review of Systems:   Constitutional no fever or chills  Skin denies rash or itching  HENT no changes in think  Eyes: Transient blurring of vision on the right side. Respiratory denies shortness of breath  Cardiovascular denies chest pain. Gastrointestinal: Has nausea and vomiting. Genitourinary no incontinence  Musculoskeletal denies joint pain or swelling  Hematology no bleeding  Neurological as above in HPI      PHYSICAL EXAMINATION:      VITAL SIGNS:  BP (!) 162/91   Pulse 60   Temp 97.7 °F (36.5 °C) (Oral)   Resp 16   Ht 5' 9\" (1.753 m)   Wt 189 lb (85.7 kg)   SpO2 100%   BMI 27.91 kg/m²     GENERAL: In no apparent distress. EXTREMITIES: Muscle tone is normal.  HEAD:   The patient is normocephalic. NEUROLOGIC EXAMINATION  Mental status: Awake, alert, oriented , follows simple and complex commands, no neglect, no extinction. Speech and languge: fluent, coherent,and comprehension intact  CN: VFF, EOMI, PERRLA, face sensation intact , no facial asymmetry noted, palate elevation symmetric bilat, SS+SCM 5/5 bilat, tongue midline  Motor: no pronator drift, tone normal throughout, strength 5/5 throughout  Sensory: intact to light touch throughout  Coordination: FNF, HS accurate w/o dysmetria  Gait: not tested       CT OF THE HEAD WITHOUT CONTRAST. CLINICAL HISTORY: Code stroke. Dizziness. Last known well at midnight. TECHNIQUE: Helical scan obtained of the head were obtained from the skull vertex   through the base of the skull without intravenous contrast.    All CT scans are   performed using dose optimization techniques as appropriate to the performed   exam including the following: Automated exposure control, adjustment of mA   and/or kV according to patient size, and use of iterative reconstructive   technique. COMPARISON: None. FINDINGS:        The sulcal pattern and ventricular system are normal in size and configuration   for age. No intracranial hemorrhage. Bilateral periventricular and deep white   matter hypodensities. Small lacunar infarct at the left cerebellum suggestive on   axial image 8-9. No mass effect. The visualized paranasal sinuses are clear. The   mastoid air cells are clear. The visualized bony structures are unremarkable. Incidental dermal calcifications along the face and forehead. Subcutaneous right   lateral scalp nodule measuring 13 mm on image 21. Impression   No acute hemorrhage. Age-indeterminate small lacunar infarct at the left cerebellum. Chronic microvascular disease. Small right lateral scalp subcutaneous nodule or complex cyst          EXAM: CTA HEAD NECK W CONTRAST        CLINICAL INDICATIONS: Stroke rule out LVA    Slurred speech and weakness       TECHNIQUE: Helical CT scan of the brain and neck were performed during rapid IV   bolus contrast administration. These data were reconstructed at .625 mm   intervals for vascular analysis. The data was also reviewed at 2.5 mm intervals   for accompanying soft tissue analysis. 3D post processed images, including   surface shaded displays, were produced for this exam on independent console,   permanently archived and interpreted. All CT scans at this facility are performed using dose optimization technique as   appropriate to a performed exam, to include automated exposure control,   adjustment of the MA and/or kV according to patient size (including appropriate   matching for site-specific examinations) or use of  iterative reconstruction   technique. COMPARISON: Concurrent CT head       CTA NECK VASCULAR ANALYSIS:        Aortic arch: Left common carotid artery originates from the brachiocephalic. Proximal branch vessels are within normal limits.        Right carotid:   -CCA: Patent   -ECA: Origin is patent   -ICA: Patent. 0% stenosis of proximal ICA by NASCET criteria. Left carotid:   -CCA: Patent   -ECA: Origin is patent   -ICA: Patent. 0% stenosis of proximal ICA by NASCET criteria. Right vertebral: Patent   Left vertebral: Patent           CTA HEAD VASCULAR ANALYSIS:        Anterior circulation:   -ICA: Patent bilaterally with mild atherosclerotic calcification. -MARIE: Patent    -MCA: Patent   -AComm: Diminutive but patent. -PComm: Hypoplastic bilaterally. Posterior circulation:   -Vertebral: Patent   -Basilar: Patent   -Superior cerebellar: Patent   -PCA: Patent       Major dural venous sinuses: Unremarkable       CTA SOFT TISSUE ANALYSIS:   Lungs: Clear   Upper chest: Unremarkable   Neck: 1.5 x 1.2 cm hypodense right thyroid nodule. 1.6 x 0.8 cm enhancing nodule   left carotid. Lymph nodes: No adenopathy   Orbits: Unremarkable   Paranasal sinuses: Mild mucosal thickening left maxillary sinus. Brain: No hemorrhage or mass effect. Bones: Degenerative disc disease without critical central canal stenosis. Impression   CTA HEAD:   Patent intracranial vasculature without evidence of occlusion. CTA NECK:   1. Patent cervical carotid and vertebral arteries without hemodynamically   significant stenosis. 2.  Right thyroid nodule measuring up to 1.5 cm, consider nonemergent outpatient   follow-up ultrasound. 3.  Right carotid enhancing nodule, may represent primary parotid lesion   including pleomorphic adenoma. TTE: Interpretation Summary    Result status: Final result       Left Ventricle: Normal left ventricular systolic function with a visually estimated EF of 60 - 65%. Left ventricle is smaller than normal. LVIDd is 3.8 cm. Moderate septal thickening. Normal wall motion. Normal diastolic function. Interatrial Septum: No obvious interatrial shunt visualized with color Doppler. Agitated saline study appears negative with and without provocation.     Pulmonic Valve: Mild regurgitation. Tricuspid Valve: Normal RVSP. The estimated RVSP is 21 mmHg. Aorta: Dilated aortic root. Ao root diameter is 3.9 cm. Dilated ascending aorta. Ao ascending diameter is 3.8 cm. Dilated aortic arch. Ao arch diameter is 3.3 cm. Descending aorta is not well-visualized. Pericardium: Left pleural effusion. Impression:   A 74years old male patient with by medical problems including stroke when he was younger was brought to the emergency room for dizziness, nausea, vomiting, unsteadiness, and slurring of speech. Symptoms started suddenly. The nausea and vomiting is getting better. Still feels unsteady when trying to walk. Neuro exam seems unremarkable. CT scan of the brain did not show any acute changes. CT angiography of the head and neck showed patent intra and extracranial circulation. 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 Symptoms are concerning for posterior circulation stroke versus TIA. His hemoglobin A1c is 6.2 and lipid panel has shown significantly elevated LDL. TTE has shown normal ejection fraction with no shunt or thrombus. Stroke mechanism when he was younger was unknown. Plan:   -Continuous telemetry  -Continue with aspirin 325 mg p.o. per day and atorvastatin 80 mg p.o. per day  -Follow MRI of the brain  -PT/OT eval and follow recommendations  -We will follow patient  -Call for questions. This document has been produced using voice recognition software. Unrecognized errors in transcription may be present.

## 2023-02-22 NOTE — PROGRESS NOTES
History and Physical    Patient: Adrianne Diggs MRN: [id number]  N:     YOB: 1950    Age: 74 y.o. Sex: male    None Provider    Subjective:      Adrianne Diggs is a 74 y.o. male with a PMHx of HTN, HLD, history of stroke at 3years old, and persistent asthma, who presented to the ED yesterday with nausea and trouble walking due to feeling dizzy. Patient explained that two nights ago he was having pain in his right eye, loss of vision in his right eye, and he was feeling nauseous and ended up vomiting. He felt better after so he went to sleep. When he woke up yesterday morning, he continued to have nausea and vomiting as well as slurred speech. His wife reported he was off balance and was having trouble walking so she called 911. EMS transport reported patient had no stroke symptoms. In the ER, code stroke was initiated. Patient was fatigued, nauseous, dizzy, and had an unstable gait. CBC and CMP was normal. CT head showed a chronic small lacunar infarct at the left cerebellum. He had no acute hemorrhage. CTA head showed patent intracranial vasculature without evidence of occlusion. CTA neck showed patent cervical carotid and vertebral arteries without significant stenosis. Right thyroid nodule measured 1.5 cm which was considered nonemergent, and right carotid enhancing nodule which may represent primary parotid lesion including pleomorphic adenoma. Tele-neurologist Dr. Randell Ayala recommended MRI and inpatient evaluation. MRI brain showed acute pontine infarct with hyperintense and restricted diffusion in the right of midline central penny. He was started on  mg QD and high intensity atorvastatin 80 mg po QD. Additionally, his total cholesterol was elevated at 267 and his LDL was 181.2, and his A1c was 6.2. He was hemodynamically stable yesterday and neurologically intact. Patient had an episode of emesis which was relieved with 4 mg of Zofran IV.      Today, patient was alert and oriented and answering questions without slurring of his speech. He was able to get up and walk with PT and states that he is feeling better without symptoms of nausea and vomiting today. Says he used to be on a statin in the past for his cholesterol but has been off of it for some time. Eats a healthy diet and stays physically active at home. Additionally, he explained that he had a stroke when he was young at 3years old. Past Medical History:   Diagnosis Date    Cerebral artery occlusion with cerebral infarction [facility])     H/O prostate cancer     Hyperlipidemia     Moderate persistent asthma     Nephrolithiasis     PTSD (post-traumatic stress disorder)      Past Surgical History:   Procedure Laterality Date    ENDOVASCULAR REPAIR PERIPHERAL ANEURYSM Right     FEMORAL ANEURYSM REPAIR Right    HERNIA REPAIR      PROSTATE SURGERY        Family History   Problem Relation Age of Onset    Dementia Mother     Prostate Cancer Father      Social History     Tobacco Use    Smoking status: Former     Types: Cigarettes    Smokeless tobacco: Never   Substance Use Topics    Alcohol use: Not on file      Prior to Admission medications    Medication Sig Start Date End Date Taking? Authorizing Provider   buPROPion (WELLBUTRIN SR) 150 MG extended release tablet TAKE 1 TABLET BY MOUTH EVERY 12 HOURS. TAKE JUST IN THE MORNING FOR THE FIRST WEEK TO ENSURE NO SIDE EFFECTS 1/19/23  Yes Historical Provider, MD   albuterol sulfate HFA (PROVENTIL;VENTOLIN;PROAIR) 108 (90 Base) MCG/ACT inhaler ProAir HFA 90 mcg/actuation aerosol inhaler   Inhale 2 puffs every 4 hours by inhalation route as needed. Yes Historical Provider, MD   budesonide-formoterol (SYMBICORT) 160-4.5 MCG/ACT AERO Inhale 2 puffs into the lungs 2 times daily   Yes Historical Provider, MD   rosuvastatin (CRESTOR) 20 MG tablet rosuvastatin 20 mg tablet   Take 1 tablet every day by oral route.  4/4/22   Historical Provider, MD        Allergies   Allergen Reactions Dulera [Mometasone Furo-Formoterol Fum] Cough       Review of Systems:  positive responses in bold type   Constitutional: Negative for fever, chills, diaphoresis and unexpected weight change. HENT: Negative for ear pain, congestion, sore throat, rhinorrhea, drooling, trouble swallowing, neck pain and tinnitus. Eyes: Positive for right eye pain and visual disturbance. Respiratory: negative for shortness of breath, cough, choking, chest tightness, wheezing or stridor. Cardiovascular: Negative for chest pain, palpitations and leg swelling. Gastrointestinal: Positive for nausea, vomiting, negative for abdominal pain, diarrhea, constipation, blood in stool, abdominal distention and anal bleeding. Genitourinary: Negative for dysuria, urgency, frequency, hematuria, flank pain and difficulty urinating. Musculoskeletal: Negative for back pain and arthralgias. Skin: Negative for color change, rash and wound. Neurological: Positive for dizziness, slurred speech, negative for seizures, syncope, light-headedness or headaches. Hematological: Does not bruise/bleed easily. Psychiatric/Behavioral: Negative for suicidal ideas, hallucinations, behavioral problems, self-injury or agitation          Objective: Body mass index is 27.91 kg/m².   Vitals:    02/22/23 0500 02/22/23 0829 02/22/23 0833 02/22/23 1138   BP: (!) 163/70 (!) 170/91  (!) 168/82   Pulse: (!) 46 63  65   Resp: 16 16  18   Temp: 97.3 °F (36.3 °C) 97.8 °F (36.6 °C)  97.8 °F (36.6 °C)   TempSrc: Axillary Oral  Oral   SpO2: 98% 99% 99% 98%   Weight:       Height:            Physical Exam:  General appearance - alert, well appearing, and in no distress  Mental status - alert, oriented to person, place, and time  Eyes - pupils equal and reactive, extraocular eye movements intact  Neck - supple, no significant adenopathy  Chest - clear to auscultation, no wheezes, rales or rhonchi, symmetric air entry  Heart - normal rate, regular rhythm, normal S1, S2, no murmurs, rubs, clicks or gallops  Abdomen - soft, nontender, nondistended, no masses or organomegaly  Neurological - alert, oriented, normal speech, no focal findings or movement disorder noted, screening mental status exam normal, motor and sensory grossly normal bilaterally  Musculoskeletal - no joint tenderness, deformity or swelling  Extremities - peripheral pulses normal, no pedal edema, no clubbing or cyanosis  Skin - normal coloration, no rashes, no suspicious skin lesions noted          [unfilled]    CBC:  Lab Results   Component Value Date/Time    WBC 6.8 02/22/2023 01:35 AM    HGB 13.3 02/22/2023 01:35 AM    HCT 42.6 02/22/2023 01:35 AM     02/22/2023 01:35 AM    MCV 86.1 02/22/2023 01:35 AM        CMP:  Lab Results   Component Value Date/Time     02/21/2023 10:15 AM    K 4.1 02/21/2023 10:15 AM     02/21/2023 10:15 AM    CO2 26 02/21/2023 10:15 AM    BUN 19 02/21/2023 10:15 AM    GLOB 3.2 02/21/2023 10:15 AM    ALT 23 02/21/2023 10:15 AM        PT/INR  Lab Results   Component Value Date/Time    INR 1.0 02/21/2023 10:15 AM            EKG: @LASTPROCAMB([id number])@       Assessment and  Plan:   Assessment:  Acute right pontine stroke  HLD, he has not been taking statin lately  HTN  Moderate persistent asthma, no current exacerbation  H/o CVA   PTSD  H/o prostate cancer   CKD3 with last Cr 1.4, Renal US was normal   Right thyroid nodule 1.5 cm  Right parotid lesion on CT    Plan:  - Continue patient on aspirin and high intensity atorvastatin  - Start patient on plavix for the next 21 days  - Follow neurology recommendations  - SLP diagnosed patient with mild dysarthria and will continue to follow patient. Tolerating regular diet with thin liquids with no difficulty. Patient may benefit from SLP upon discharge per SLP recs.   - Continue working with PT/OT  - Will need outpatient US thyroid to evaluate thyroid nodule   - Counseled patient in detail regarding need to be on a statin due to his high cholesterol levels      Signed By: Ap Johnson     February 22, 2023

## 2023-02-22 NOTE — PROGRESS NOTES
[unfilled]     Re:  Belkis Torres Jr.,Follow up visit     2/22/2023 3:21 PM    SSN: [id number]    Subjective:   Edi Moon. was seen on follow-up today. No acute changes overnight. He still feels unsteady when trying to walk. Denied feeling dizzy/spinning sensation. No nausea or vomiting currently. When he tries to stand up, she feels his legs are weak. No numbness. No blurring of vision or diplopia. MRI of the brain showed an acute pontine infarction over the right paramedian area. There are chronic microvascular ischemic changes; small chronic left cerebellar infarction seen.       Medications:    Current Facility-Administered Medications   Medication Dose Route Frequency Provider Last Rate Last Admin    [START ON 2/23/2023] aspirin EC tablet 81 mg  81 mg Oral Daily Mireya Albert MD        ondansetron (ZOFRAN-ODT) disintegrating tablet 4 mg  4 mg Oral Q8H PRN Karen Desai MD        Or    ondansetron (ZOFRAN) injection 4 mg  4 mg IntraVENous Q6H PRN Karen Desai MD        polyethylene glycol (GLYCOLAX) packet 17 g  17 g Oral Daily PRN Karen Desai MD        enoxaparin (LOVENOX) injection 40 mg  40 mg SubCUTAneous Daily Karen Desai MD   40 mg at 02/22/23 0832    atorvastatin (LIPITOR) tablet 80 mg  80 mg Oral Nightly Karen Desai MD   80 mg at 02/21/23 2112    labetalol (NORMODYNE;TRANDATE) injection 10 mg  10 mg IntraVENous Q10 Min PRN Karen Desai MD        famotidine (PEPCID) tablet 20 mg  20 mg Oral QHS Karen Desai MD   20 mg at 02/21/23 2112    ipratropium-albuterol (DUONEB) nebulizer solution 1 ampule  1 ampule Inhalation Q4H PRN Karen Desai MD        budesonide (PULMICORT) nebulizer suspension 500 mcg  0.5 mg Nebulization BID Shireen Craig MD   500 mcg at 02/22/23 0833    arformoterol tartrate (BROVANA) nebulizer solution 15 mcg  15 mcg Nebulization BID Shireen Craig MD   15 mcg at 02/22/23 0833       Vital signs:  BP (!) 168/82   Pulse 65   Temp 97.8 °F (36.6 °C) (Oral)   Resp 18 Ht 5' 9\" (1.753 m)   Wt 189 lb (85.7 kg)   SpO2 98%   BMI 27.91 kg/m²     Review of Systems:   Constitutional no fever or chills  Skin denies rash or itching  HENT no changes in think  Eyes: Transient blurring of vision on the right side. Respiratory denies shortness of breath  Cardiovascular denies chest pain. Gastrointestinal: the nausea and vomiting has subsided. Genitourinary no incontinence  Musculoskeletal denies joint pain or swelling  Hematology no bleeding  Neurological as above in HPI      Patient Active Problem List   Diagnosis    TIA (transient ischemic attack)    Acute [facility]cerebrovascular accident) [facility]         Objective:   GENERAL:                  In no apparent distress. EXTREMITIES:           Muscle tone is normal.  HEAD:                         The patient is normocephalic. NEUROLOGIC EXAMINATION    Mental status: Awake, alert, oriented , follows simple and complex commands, no neglect, no extinction. Speech and languge: fluent, coherent,and comprehension intact  CN: VFF, EOMI, PERRLA, face sensation intact , no facial asymmetry noted, palate elevation symmetric bilat, SS+SCM 5/5 bilat, tongue midline  Motor: no pronator drift, tone normal throughout, strength 5/5 throughout  Sensory: intact to light touch throughout  Coordination: FNF, HS accurate w/o dysmetria  Gait: Slow, unsteady, slightly wide-based. EXAM: CTA HEAD NECK W CONTRAST        CLINICAL INDICATIONS: Stroke rule out LVA    Slurred speech and weakness       TECHNIQUE: Helical CT scan of the brain and neck were performed during rapid IV   bolus contrast administration. These data were reconstructed at .625 mm   intervals for vascular analysis. The data was also reviewed at 2.5 mm intervals   for accompanying soft tissue analysis. 3D post processed images, including   surface shaded displays, were produced for this exam on independent console,   permanently archived and interpreted.        All CT scans at this facility are performed using dose optimization technique as   appropriate to a performed exam, to include automated exposure control,   adjustment of the MA and/or kV according to patient size (including appropriate   matching for site-specific examinations) or use of  iterative reconstruction   technique. COMPARISON: Concurrent CT head       CTA NECK VASCULAR ANALYSIS:        Aortic arch: Left common carotid artery originates from the brachiocephalic. Proximal branch vessels are within normal limits. Right carotid:   -CCA: Patent   -ECA: Origin is patent   -ICA: Patent. 0% stenosis of proximal ICA by NASCET criteria. Left carotid:   -CCA: Patent   -ECA: Origin is patent   -ICA: Patent. 0% stenosis of proximal ICA by NASCET criteria. Right vertebral: Patent   Left vertebral: Patent           CTA HEAD VASCULAR ANALYSIS:        Anterior circulation:   -ICA: Patent bilaterally with mild atherosclerotic calcification. -MARIE: Patent    -MCA: Patent   -AComm: Diminutive but patent. -PComm: Hypoplastic bilaterally. Posterior circulation:   -Vertebral: Patent   -Basilar: Patent   -Superior cerebellar: Patent   -PCA: Patent       Major dural venous sinuses: Unremarkable       CTA SOFT TISSUE ANALYSIS:   Lungs: Clear   Upper chest: Unremarkable   Neck: 1.5 x 1.2 cm hypodense right thyroid nodule. 1.6 x 0.8 cm enhancing nodule   left carotid. Lymph nodes: No adenopathy   Orbits: Unremarkable   Paranasal sinuses: Mild mucosal thickening left maxillary sinus. Brain: No hemorrhage or mass effect. Bones: Degenerative disc disease without critical central canal stenosis. Impression   CTA HEAD:   Patent intracranial vasculature without evidence of occlusion. CTA NECK:   1. Patent cervical carotid and vertebral arteries without hemodynamically   significant stenosis. 2.  Right thyroid nodule measuring up to 1.5 cm, consider nonemergent outpatient   follow-up ultrasound.    3. Right carotid enhancing nodule, may represent primary parotid lesion   including pleomorphic adenoma. MRI BRAIN WITHOUT CONTRAST       PROVIDED REASON FOR EXAM: code stroke   Additional History: None   Comparison Studies: CT head, CTA head and neck earlier same day, 2/21/2023       Imaging Technique:      Sequences:  Sagittal,  Coronal and axial T1 weighted, Diffusion Weighted   (DWI), Axial T2 weighted, Axial T2 FLAIR, and SWI/GRE MRI images of the brain. Contrast Material:  NONE       Limiting Factors/Major Artifacts: None. FINDINGS:       Brain Parenchyma: On axial DWI image 11 there is a band of increased DWI signal   in the right of midline central penny. The abnormal signal is seen on single   axial DWI image only. However, there is corresponding hypointense ADC map signal   and the findings are consistent with an acute pontine infarct. No other findings   of acute infarction. Small chronic left cerebellar infarct. T2 FLAIR hyperintense signal changes in   the periventricular white matter are consistent with chronic microvascular   ischemic changes. No visible masses or midline shift. CSF Spaces:  Normal in size and morphology for the patient's age. No   hydrocephalus. Vascular System:  Grossly patent flow in basilar and internal cerebral arteries. No findings of dural sinus thrombosis. Hemorrhage:  No acute hemorrhage. No chronic microhemorrhage. Other Structures:       Calvarium: No suspicious marrow signal.       Sella: Pituitary is not enlarged. Visualized Upper Cervical Spine: Normal.   Orbits: Normal for non-dedicated exam.   Paranasal Sinuses: No significant paranasal sinus disease. Mastoid Air Cells:  Clear. Impression   1. Acute pontine infarct   -Diffusion sequence shows somewhat focus of abnormal hyperintense and restricted   diffusion in the right of midline central penny.  More linear than typical central   pontine infarct, but does not appear to be artifact. 2. Chronic microvascular ischemic changes in the cerebral white matter. 3. Small chronic left cerebellar infarct. TTE: Interpretation Summary     Result status: Final result       Left Ventricle: Normal left ventricular systolic function with a visually estimated EF of 60 - 65%. Left ventricle is smaller than normal. LVIDd is 3.8 cm. Moderate septal thickening. Normal wall motion. Normal diastolic function. Interatrial Septum: No obvious interatrial shunt visualized with color Doppler. Agitated saline study appears negative with and without provocation. Pulmonic Valve: Mild regurgitation. Tricuspid Valve: Normal RVSP. The estimated RVSP is 21 mmHg. Aorta: Dilated aortic root. Ao root diameter is 3.9 cm. Dilated ascending aorta. Ao ascending diameter is 3.8 cm. Dilated aortic arch. Ao arch diameter is 3.3 cm. Descending aorta is not well-visualized. Pericardium: Left pleural effusion. Impression:      A 74years old male patient with by medical problems including stroke when he was younger was brought to the emergency room for dizziness, nausea, vomiting, unsteadiness, and slurring of speech. Symptoms started suddenly. The nausea and vomiting is getting better. Still feels unsteady when trying to walk. Neuro exam seems unremarkable. CT scan of the brain did not show any acute changes. MRI of the brain as shown an acute right paramedian pontine infarction; there is also small chronic left cerebellar infarction. CT angiography of the head and neck showed patent intra and extracranial circulation. 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 His hemoglobin A1c is 6.2 and lipid panel has shown significantly elevated LDL. TTE has shown normal ejection fraction with no shunt or thrombus. Stroke mechanism: Small vessel disease.         Plan:     -Continuous telemetry  -Continue with aspirin 325 mg p.o. per day and atorvastatin 80 mg p.o. per day  -PT/OT eval and follow recommendations  -We will follow patient as needed  -Call for questions.  -Follow-up in the neurology clinic in 4 weeks time    Sincerely,      Dali Rizo M.D. PLEASE NOTE:   This document has been produced using voice recognition software. Unrecognized errors in transcription may be present.

## 2023-02-22 NOTE — PROGRESS NOTES
Called pharmacy to have on call dose of ativan retimed for now as it was timed for 0600 on 2/22/2023. Pharmacy to change time. MRI called and made aware of delay, will call them to notify them when medication has been administered.

## 2023-02-22 NOTE — PROGRESS NOTES
OCCUPATIONAL THERAPY EVALUATION/DISCHARGE    Patient: Eliane Leija (69 y.o. male)  Date: 2/22/2023  Primary Diagnosis: TIA (transient ischemic attack) [G45.9]  Precautions: Fall Risk  PLOF: Patient was independent with self-care and functional mobility PTA. Patient reports having a cane and needing it for RLE at times but mostly does not use AD. ASSESSMENT AND RECOMMENDATIONS:  Patient cleared to participate in OT evaluation by RN. Upon entering the room, the patient was standing looking out of window holding rolling walker. Patient was seen with PT to maximize patient safety, participation, and functional mobility in preparation for self-care tasks. Patient educated on the role of OT, evaluation process, and safety during this admission with patient verbalizing understanding. Patient admitted with unsteady gait, decreased vision on R and dizziness in addition to slurred speech. Imaging (+) acute pontine infarct and small chronic left cerebellar infarct. Patient reporting vision improvement; able to read OT nametag with additional time and glasses donned. Patient reports vision deficits at baseline. Patient unsteady requiring contact guard - minimal assistance for standing tasks/functional mobility initially, progressed to stand by assistance given verbal cues and distance. Patient independent for ADLs and reports no further ADL concerns. Educated patient on safety using stepover tub and to use walk-in until pt feels steady again. Maximum therapeutic gains met at current level of care and patient will be discharged from occupational therapy at this time. Further Equipment Recommendations for Discharge: rolling walker and shower chair to decrease the risk of falls    Discharge Recommendations: Home with increased family supervision    [facility]:Current research shows that an AM-PAC score of 18 or greater is associated with a discharge to the patient's home setting.   Based on an AM-PAC score of 21/24 and their current ADL deficits; it is recommended that the patient have 2-3 sessions per week of Occupational Therapy at d/c to assess the patient's independence within the home setting. This [facility] score should be considered in conjunction with interdisciplinary team recommendations to determine the most appropriate discharge setting. Patient's social support, diagnosis, medical stability, and prior level of function should also be taken into consideration.      SUBJECTIVE:   Patient stated Im just kidding with you as pt bends forward out of [place] to stretch requiring self-correction and \"Im very stubborn, I dont follow instructions well\"    OBJECTIVE DATA SUMMARY:     Past Medical History:   Diagnosis Date    Cerebral artery occlusion with cerebral infarction ([facility]     H/O prostate cancer     Hyperlipidemia     Moderate persistent asthma     Nephrolithiasis     PTSD (post-traumatic stress disorder)      Past Surgical History:   Procedure Laterality Date    ENDOVASCULAR REPAIR PERIPHERAL ANEURYSM Right     FEMORAL ANEURYSM REPAIR Right    HERNIA REPAIR      PROSTATE SURGERY         Home Situation:   Social/Functional History  Lives With: Spouse  Type of Home: House  Home Layout: Two level, Able to Live on Main level with bedroom/bathroom  Home Access: Stairs to enter with rails  Bathroom Shower/Tub: Tub/Shower unit, Walk-in shower  Home Equipment: Cane  ADL Assistance: Independent  [x]  Right hand dominant   []  Left hand dominant    Cognitive/Behavioral Status:  Orientation Level: Oriented to place;Oriented to person;Oriented to situation    Skin: Intact  Edema: None noted    Vision/Perceptual:    Vision: Impaired  (Able to read nametag with additional time and glasses donned)  Tracking: Able to track stimulus in all quads w/o difficulty      Coordination: BUE  Within Functional Limits    Balance:  Balance  Standing: With support  Standing - Static: Good  Standing - Dynamic: Fair    Strength: BUE  Strength: Within functional limits    Tone & Sensation: BUE  Tone: Normal  Sensation: Intact    Range of Motion: BUE  AROM: Within functional limits      Functional Mobility and Transfers for ADLs:    Transfers:  Transfer Training  Transfer Training: Yes  Stand to Sit: Modified independent    ADL Assessment:   Feeding: Independent  Grooming: Independent Stand by Assistance (standing)  UE Bathing: Independent  LE Bathing: Independent; Stand by Assistance (standing)  UE Dressing: Independent  LE Dressing: Independent Stand by Assistance (standing)  Toileting: Independent    Pain:  Pain level pre-treatment: 0/10   Pain level post-treatment: 0/10   Pain Intervention(s): Rest, Ice, Repositioning   Response to intervention: Nurse notified    Activity Tolerance:   Activity Tolerance: Patient Tolerated treatment well  Please refer to the flowsheet for vital signs taken during this treatment. After treatment:   [x] Patient left in no apparent distress sitting up in chair  [] Patient left in no apparent distress in bed  [x] Call bell left within reach  [x] Nursing notified  [x] Spouse present  [] Bed alarm activated    COMMUNICATION/EDUCATION:   Patient Education  Education Given To: Patient; Family  Education Provided: Role of Therapy;Plan of Care;Precautions; Family Education; Fall Prevention Strategies  Education Method: Demonstration;Verbal;Teach Back  Barriers to Learning: None  Education Outcome: Verbalized understanding    Thank you for this referral.  Khloe Esparza OTR/L  Minutes: 31    Eval Complexity: [facility] AM-PAC® Daily Activity Inpatient Short Form (6-Clicks)*    How much HELP from another person does the patient currently need    (If the patient hasn't done an activity recently, how much help from another person do you think he/she would need if he/she tried?)   Total (Total A or Dep)   A Lot  (Mod to Max A)   A Little (Sup or Min A)   None (Mod I to I)   Putting on and taking off regular lower body clothing? [] 1 [] 2 [x] 3 [] 4   2. Bathing (including washing, rinsing,      drying)? [] 1 [] 2 [x] 3 [] 4   3. Toileting, which includes using toilet, bedpan or urinal?   [] 1 [] 2 [] 3 [x] 4   4. Putting on and taking off regular upper body clothing? [] 1 [] 2 [] 3 [x] 4   5. Taking care of personal grooming such as brushing teeth? [] 1 [] 2 [x] 3 [] 4   6. Eating meals?    [] 1 [] 2 [] 3 [x] 4

## 2023-02-22 NOTE — PLAN OF CARE
Problem: SLP Adult - Impaired Communication  Goal: By Discharge: Demonstrates communication skills at highest level of function for planned discharge setting. See evaluation for individualized goals. Description: Pt will:   1. Utilize compensatory strategies (decrease rate, overarticulate, increase intensity) to increase speech clarity in 4/5 trials given min multi-modal cues. 2. Perform speech oral motor exercises (with and without resistance) in therapy and at home to increase oral motor strength/range-of-motion for articulation tasks in 4/5 trials given min multi-modal cues. 3.  Will improve respiratory support and use diaphragmatic breathing to produce vowel prolongations, serial speech tasks and phrases in 4/5 trials given min multi-modal cues. Outcome: Progressing       Speech LAnguage Pathology evaluation/TREATMENT    Patient: Edi Moon. (69 y.o. male)  Date: 2/22/2023  Primary Diagnosis: TIA (transient ischemic attack) [G45.9]  Precautions: Standard  PLOF: As per H&P    ASSESSMENT :  Based on the objective data described below, the patient presents with mild dysarthria. Tolerating regular diet with thin liquids with no difficulty (passed bedside nursing screen and no deficits reported by pt or RN). Pt reporting hx of speech impediment that required SLP intervention but reporting current speech not at baseline. Oral motor structures discoordinated and pt with mildly decreased breath support. Demo imprecise articulation and blended word boundaries. Recommend SLP to address current deficits for improved functional independence and quality of life. Treatment:    Training and education with written handout provided regarding diagnosis of dysarthria, comp strategies, environmental modifications. Understanding verbalized. Will follow during this admission. Pt may benefit from SLP upon discharge pending progress.       Patient will benefit from skilled intervention to address the above impairments.  Patient's rehabilitation potential is considered to be Excellent   Factors which may influence rehabilitation potential include:   []              None noted  []              Mental ability/status  [x]              Medical condition  []              Home/family situation and support systems  []              Safety awareness  []              Pain tolerance/management  []              Other:      PLAN :  Recommendations and Planned Interventions:  Recommendations  Requires SLP Intervention: Yes  Frequency/Duration: Patient will be followed by speech-language pathology 1-2 times per day/3-5 days per week to address goals.     SUBJECTIVE:   Patient stated, “Thank you”    OBJECTIVE:     Past Medical History:   Diagnosis Date    Cerebral artery occlusion with cerebral infarction (HCC)     H/O prostate cancer     Hyperlipidemia     Moderate persistent asthma     Nephrolithiasis     PTSD (post-traumatic stress disorder)      Past Surgical History:   Procedure Laterality Date    ENDOVASCULAR REPAIR PERIPHERAL ANEURYSM Right     FEMORAL ANEURYSM REPAIR Right    HERNIA REPAIR      PROSTATE SURGERY       Prior Level of Function/Home Situation:  Home  Mental Status:  Orientation  Overall Orientation Status: Within Normal Limits    Motor Speech:  Oral/Motor  Oral Hygiene: Moist     Language Comprehension and Expression:  Auditory Comprehension  Comprehension: Within Functional Limits  Expression  Primary Mode of Expression: Verbal  Verbal Expression  Verbal Expression: Within functional limits     Neuro-Linguistics:  Attention  Attention: Within Functional Limits    SPEECH THERAPY DIAGNOSIS:    Dysarthria, mild     The severity rating is based on the following outcomes:    Clinical judgment     PAIN:  Pt reports 0/10 pain or discomfort prior to evaluation.  Pt reports 0/10 pain or discomfort post evaluation.     After treatment:   [] Patient left in no apparent distress sitting up in chair  [x] Patient left in no  apparent distress in bed  [x] Call bell left within reach  [x]  Nursing notified  []  Caregiver present  []  Bed alarm activated    COMMUNICATION/EDUCATION:   [x] Patient educated regarding compensatory speech/language/comprehension techniques  provided via demonstration, verbalization and teach back of comprehension  [x] Patient/family have participated as able in goal setting and plan of care. [] Patient/family agree to work toward stated goals and plan of care. [] Patient understands intent and goals of therapy, neutral about participation.   [] Patient unable to participate in goal setting/plan of care secondary to cognition, hearing/vision deficits; education ongoing with interdisciplinary staff     Thank you for this referral.  Alyson Paez M.S., CCC-SLP

## 2023-02-22 NOTE — PROGRESS NOTES
Physical Therapy  PHYSICAL THERAPY EVALUATION/DISCHARGE    Patient: Nikki Diaz (69 y.o. male)  Date: 2/22/2023  Primary Diagnosis: TIA (transient ischemic attack) [G45.9]       Precautions: Fall Risk  PLOF: pt was ind PTA, lives with wife in a 2 , [facility] if needed     ASSESSMENT AND RECOMMENDATIONS:  Pt received up with RW in room in [facility]. Pt is without strength or ROM changes. Pt does have mild unsteadiness noted with ambulation trialed with no AD thus returned pt to utilizing RW and increased level of assist to supervision for safety as he required verbal cues for correct positioning of self within walker. Pt is close to baseline functional mobility and does not require further acute PT. Left in recliner with all needs met and call bell within reach. Recommend RW upon discharge. Patient does not require further skilled physical therapy intervention at this level of care. Further Equipment Recommendations for Discharge: rolling walker    Discharge Recommendations: Home with Home health PT    AMPAC: Current research shows that an AM-PAC score of 18 (14 without stairs) or greater is associated with a discharge to the patient's home setting. Based on an AM-PAC score of 20/24 (or **/20 if omitting stairs) and their current functional mobility deficits, it is recommended that the patient have 2-3 sessions per week of Physical Therapy at d/c to increase the patient's independence. This AMPAC score should be considered in conjunction with interdisciplinary team recommendations to determine the most appropriate discharge setting. Patient's social support, diagnosis, medical stability, and prior level of function should also be taken into consideration. SUBJECTIVE:   Patient stated I like the walker.     OBJECTIVE DATA SUMMARY:     Past Medical History:   Diagnosis Date    Cerebral artery occlusion with cerebral infarction [facility])     H/O prostate cancer     Hyperlipidemia     Moderate persistent asthma     Nephrolithiasis     PTSD (post-traumatic stress disorder)      Past Surgical History:   Procedure Laterality Date    ENDOVASCULAR REPAIR PERIPHERAL ANEURYSM Right     FEMORAL ANEURYSM REPAIR Right    HERNIA REPAIR      PROSTATE SURGERY         Home Situation:  Social/Functional History  Lives With: Spouse  Type of Home: House  Home Layout: Two level, Able to Live on Main level with bedroom/bathroom  Home Access: Stairs to enter with rails  Entrance Stairs - Number of Steps: 5  Bathroom Shower/Tub: Tub/Shower unit, Walk-in shower  Home Equipment: [employer]  ADL Assistance: Independent  Critical Behavior:  Orientation Level: Oriented to place;Oriented to person;Oriented to situation      Strength:    Strength: Within functional limits    Tone & Sensation:   Tone: normal  Sensation: intact  Range Of Motion:  AROM: Within functional limits       Functional Mobility:    Transfers:     Transfer Training  Transfer Training: Yes  Sit to Stand: Modified independent  Stand to Sit: Modified independent  Balance:     Balance  Sitting: Intact  Standing: With support  Standing - Static: Good  Standing - Dynamic: Fair    Ambulation/Gait Training:    Gait  Base of Support: Narrowed  Speed/Malaika: Pace decreased (< 100 feet/min)  Step Length: Left shortened;Right shortened  Gait Abnormalities: Decreased step clearance  Distance (ft): 150 Feet  Assistive Device: Walker, rolling    Pain:  Pain level pre-treatment: 0/10   Pain level post-treatment: 0/10   Pain Intervention(s): Medication (see MAR); Rest, Ice, Repositioning  Response to intervention: Nurse notified    Activity Tolerance:   Activity Tolerance: Patient tolerated evaluation without incident  Please refer to the flowsheet for vital signs taken during this treatment.     After treatment:   [x]         Patient left in no apparent distress sitting up in chair  []         Patient left in no apparent distress in bed  [x]         Call bell left within reach  [x] Nursing notified  [x]         Caregiver present  []         Bed alarm activated  []         SCDs applied    COMMUNICATION/EDUCATION:   Patient Education  Education Given To: Patient  Education Provided: Role of Therapy;Plan of Care;Transfer Training;Equipment; Family Education  Education Method: Demonstration  Barriers to Learning: None  Education Outcome: Verbalized understanding;Demonstrated understanding    Thank you for this referral.  Ann Marie Coulter, PT  Minutes: 31      Eval Complexity: Decision Making: R Matthew 39 AM-PAC® Basic Mobility Inpatient Short Form (6-Clicks) Version 2    How much HELP from another person does the patient currently need    (If the patient hasn't done an activity recently, how much help from another person do you think he/she would need if he/she tried?)   Total (Total A or Dep)   A Lot  (Mod to Max A)   A Little (Sup or Min A)   None (Mod I to I)   Turning from your back to your side while in a flat bed without using bedrails? [] 1 [] 2 [] 3 [x] 4   2. Moving from lying on your back to sitting on the side of a flat bed without using bedrails? [] 1 [] 2 [] 3 [x] 4   3. Moving to and from a bed to a chair (including a wheelchair)? [] 1 [] 2 [x] 3 [] 4   4. Standing up from a chair using your arms (e.g., wheelchair, or bedside chair)? [] 1 [] 2 [x] 3 [] 4   5. Walking in hospital room? [] 1 [] 2 [x] 3 [] 4   6. Climbing 3-5 steps with a railing?+   [] 1 [] 2 [x] 3 [] 4   +If stair climbing cannot be assessed, skip item #6. Sum responses from items 1-5. Current research shows that an AM-PAC score of 18 (14 without stairs) or greater is associated with a discharge to the patient's home setting. Based on an AM-PAC score of 20/24 (or **/20 if omitting stairs) and their current functional mobility deficits, it is recommended that the patient have 2-3 sessions per week of Physical Therapy at d/c to increase the patient's independence.

## 2023-02-22 NOTE — PROGRESS NOTES
[facility]  Progress Note    Patient: Nelda Mcardle. Age: 74 y.o. : 1950 MR#: [id number] SSN: [id number]  Date/Time: 2023     C/C: Acute ataxia       Subjective:   HPI : 66-year-old male past medical history of hyperlipidemia history of childhood stroke etiology or reason or history around that is not clear now being admitted after 2 days of not feeling well nausea vomiting which improved but is dysarthria and ataxia is persistent and MRI was done which suggested pontine stroke MRA is unremarkable          Review of Systems:  positive responses in bold type   Constitutional: Negative for fever, chills, diaphoresis and unexpected weight change. HENT: Negative for ear pain, congestion, sore throat, rhinorrhea, drooling, trouble swallowing, neck pain and tinnitus. Eyes: Negative for photophobia, pain, redness and visual disturbance. Respiratory: negative for shortness of breath, cough, choking, chest tightness, wheezing or stridor. Cardiovascular: Negative for chest pain, palpitations and leg swelling. Gastrointestinal: Negative for nausea, vomiting, abdominal pain, diarrhea, constipation, blood in stool, abdominal distention and anal bleeding. Genitourinary: Negative for dysuria, urgency, frequency, hematuria, flank pain and difficulty urinating. Musculoskeletal: Negative for back pain and arthralgias. Skin: Negative for color change, rash and wound. Neurological: Negative for dizziness, seizures, syncope, speech difficulty, light-headedness or headaches. Hematological: Does not bruise/bleed easily. Psychiatric/Behavioral: Negative for suicidal ideas, hallucinations, behavioral problems, self-injury or agitation     Assessment/Plan:     1.   Acute pontine CVA  2 hyperlipidemia  3 history of childhood CVA    Plan    Aspirin 325 mg p.o. once a day  Discussed with neurology  Patient really needs PT OT aggressively, if he agrees then either ARU or SNF otherwise home PT OT        Objective:       General:  Alert, cooperative, no acute distress   HEENT: No facial asymmetry, SENTHIL Isidro, External ears - WNL    Cardiovascular: S1S2 - regular , No Murmur   Pulmonary: Equal expansion , No Use of accessory muscles , No Rales No Rhonchi    GI:  +BS in all four quadrants, soft, non-tender  Extremities:  No edema; 2+ dorsalis pedis pulses bilaterally  Neuro: Alert awake oriented some dysarthria but mainly ataxia requiring walker to walk      DVT Prophylaxis:  []Lovenox  []Hep SQ  []SCDs  []Coumadin   []On Heparin gtt    [] Eliquis [] Xarelto     Vitals:         VS: BP (!) 156/86   Pulse 68   Temp 97.7 °F (36.5 °C) (Oral)   Resp 18   Ht 5' 9\" (1.753 m)   Wt 189 lb (85.7 kg)   SpO2 98%   BMI 27.91 kg/m²    Tmax/24hrs: Temp (24hrs), Av.7 °F (36.5 °C), Min:97.3 °F (36.3 °C), Max:98 °F (36.7 °C)        Medications:   Current Facility-Administered Medications   Medication Dose Route Frequency    [START ON 2023] aspirin EC tablet 81 mg  81 mg Oral Daily    ondansetron (ZOFRAN-ODT) disintegrating tablet 4 mg  4 mg Oral Q8H PRN    Or    ondansetron (ZOFRAN) injection 4 mg  4 mg IntraVENous Q6H PRN    polyethylene glycol (GLYCOLAX) packet 17 g  17 g Oral Daily PRN    enoxaparin (LOVENOX) injection 40 mg  40 mg SubCUTAneous Daily    atorvastatin (LIPITOR) tablet 80 mg  80 mg Oral Nightly    labetalol (NORMODYNE;TRANDATE) injection 10 mg  10 mg IntraVENous Q10 Min PRN    famotidine (PEPCID) tablet 20 mg  20 mg Oral QHS    ipratropium-albuterol (DUONEB) nebulizer solution 1 ampule  1 ampule Inhalation Q4H PRN    budesonide (PULMICORT) nebulizer suspension 500 mcg  0.5 mg Nebulization BID    arformoterol tartrate (BROVANA) nebulizer solution 15 mcg  15 mcg Nebulization BID       Labs:    Recent Labs     23  1015 23  0135   WBC 9.1 6.8   HGB 14.1 13.3   HCT 44.0 42.6    239     Recent Labs     23  1015      K 4.1      CO2 26 BUN 19*   ALT 23   INR 1.0         Time spent on direct patient care >35 mints     Complexity : High complex - due to multiple medical issues outlined above. CODE Status : Full code    Case discussed with:  [x]Patient  [x] Family patient's wife []Nursing  []Case Management         Disclaimer: Sections of this note are dictated utilizing voice recognition software, which may have resulted in some phonetic based errors in grammar and contents. Even though attempts were made to correct all the mistakes, some may have been missed, and remained in the body of the document. If questions arise, please contact our department.     Signed By: Jennifer Mahmood MD     February 22, 2023

## 2023-02-23 ENCOUNTER — HOME HEALTH ADMISSION (OUTPATIENT)
Age: 73
End: 2023-02-23
Payer: MEDICARE

## 2023-02-23 VITALS
OXYGEN SATURATION: 95 % | SYSTOLIC BLOOD PRESSURE: 163 MMHG | DIASTOLIC BLOOD PRESSURE: 87 MMHG | RESPIRATION RATE: 18 BRPM | TEMPERATURE: 97.4 F | WEIGHT: 189 LBS | BODY MASS INDEX: 27.99 KG/M2 | HEART RATE: 89 BPM | HEIGHT: 69 IN

## 2023-02-23 LAB
ANION GAP SERPL CALC-SCNC: 6 MMOL/L (ref 3–18)
BUN SERPL-MCNC: 18 MG/DL (ref 7–18)
BUN/CREAT SERPL: 13 (ref 12–20)
CALCIUM SERPL-MCNC: 9.3 MG/DL (ref 8.5–10.1)
CHLORIDE SERPL-SCNC: 108 MMOL/L (ref 100–111)
CO2 SERPL-SCNC: 27 MMOL/L (ref 21–32)
CREAT SERPL-MCNC: 1.42 MG/DL (ref 0.6–1.3)
GLUCOSE SERPL-MCNC: 165 MG/DL (ref 74–99)
POTASSIUM SERPL-SCNC: 4 MMOL/L (ref 3.5–5.5)
SODIUM SERPL-SCNC: 141 MMOL/L (ref 136–145)

## 2023-02-23 PROCEDURE — 6360000002 HC RX W HCPCS: Performed by: INTERNAL MEDICINE

## 2023-02-23 PROCEDURE — 36415 COLL VENOUS BLD VENIPUNCTURE: CPT

## 2023-02-23 PROCEDURE — 94640 AIRWAY INHALATION TREATMENT: CPT

## 2023-02-23 PROCEDURE — 80048 BASIC METABOLIC PNL TOTAL CA: CPT

## 2023-02-23 PROCEDURE — 99239 HOSP IP/OBS DSCHRG MGMT >30: CPT | Performed by: INTERNAL MEDICINE

## 2023-02-23 PROCEDURE — 94761 N-INVAS EAR/PLS OXIMETRY MLT: CPT

## 2023-02-23 PROCEDURE — 6370000000 HC RX 637 (ALT 250 FOR IP): Performed by: INTERNAL MEDICINE

## 2023-02-23 RX ORDER — POLYETHYLENE GLYCOL 3350 17 G/17G
17 POWDER, FOR SOLUTION ORAL DAILY PRN
Qty: 527 G | Refills: 1 | Status: SHIPPED | OUTPATIENT
Start: 2023-02-23 | End: 2023-02-23 | Stop reason: SDUPTHER

## 2023-02-23 RX ORDER — ATORVASTATIN CALCIUM 80 MG/1
80 TABLET, FILM COATED ORAL NIGHTLY
Qty: 30 TABLET | Refills: 3 | Status: SHIPPED | OUTPATIENT
Start: 2023-02-23 | End: 2023-02-23 | Stop reason: SDUPTHER

## 2023-02-23 RX ORDER — FAMOTIDINE 20 MG/1
20 TABLET, FILM COATED ORAL
Qty: 60 TABLET | Refills: 3 | Status: SHIPPED | OUTPATIENT
Start: 2023-02-23 | End: 2023-02-23 | Stop reason: SDUPTHER

## 2023-02-23 RX ORDER — ATORVASTATIN CALCIUM 80 MG/1
80 TABLET, FILM COATED ORAL NIGHTLY
Qty: 30 TABLET | Refills: 3 | Status: SHIPPED | OUTPATIENT
Start: 2023-02-23

## 2023-02-23 RX ORDER — ASPIRIN 325 MG
325 TABLET ORAL DAILY
Qty: 30 TABLET | Refills: 3 | Status: SHIPPED | OUTPATIENT
Start: 2023-02-23 | End: 2023-02-23 | Stop reason: SDUPTHER

## 2023-02-23 RX ORDER — ASPIRIN 325 MG
325 TABLET ORAL DAILY
Status: DISCONTINUED | OUTPATIENT
Start: 2023-02-23 | End: 2023-02-23 | Stop reason: HOSPADM

## 2023-02-23 RX ORDER — POLYETHYLENE GLYCOL 3350 17 G/17G
17 POWDER, FOR SOLUTION ORAL DAILY PRN
Qty: 30 EACH | Refills: 1 | Status: SHIPPED | OUTPATIENT
Start: 2023-02-23 | End: 2023-03-25

## 2023-02-23 RX ORDER — FAMOTIDINE 20 MG/1
20 TABLET, FILM COATED ORAL
Qty: 60 TABLET | Refills: 3 | Status: SHIPPED | OUTPATIENT
Start: 2023-02-23

## 2023-02-23 RX ORDER — ASPIRIN 325 MG
325 TABLET ORAL DAILY
Qty: 30 TABLET | Refills: 3 | Status: SHIPPED | OUTPATIENT
Start: 2023-02-23

## 2023-02-23 RX ADMIN — ARFORMOTEROL TARTRATE 15 MCG: 15 SOLUTION RESPIRATORY (INHALATION) at 08:34

## 2023-02-23 RX ADMIN — BUDESONIDE 500 MCG: 0.5 INHALANT RESPIRATORY (INHALATION) at 08:34

## 2023-02-23 RX ADMIN — ENOXAPARIN SODIUM 40 MG: 100 INJECTION SUBCUTANEOUS at 09:30

## 2023-02-23 RX ADMIN — ASPIRIN 325 MG ORAL TABLET 325 MG: 325 PILL ORAL at 09:29

## 2023-02-23 NOTE — CARE COORDINATION
CM spoke with patient, PCP updated in patient's chart to [facility], with [facility] on [address] FOC verbal consent given for [facility]. Patient's wife to transport patient home today at time of discharge. CM spoke with Valentina Stafford with [facility], updated her on PCP, is with [facility], Christine Schmitt, said they can take patient. CM put patient in the queue for [facility]. CM updated Judy Gore RN, that [address] has been setup for patient.              Marcella Kendrick, RN  Case Management [phone]

## 2023-02-23 NOTE — HOME CARE
Received home health referral for [facility] for (SN, PT, OT). Discharge order noted for today. Spoke with patient via phone with spouse at side;  patient identifiers verified. Explained home health care services and routines. Demographics verified including insurance, phone and address confirmed. Caregivers available spouse as primary caregiver. Orders noted and arranged to be processed by central intake.       ---   Valentina Ward LPN  [facility] - INPATIENT Liaison

## 2023-02-23 NOTE — PROGRESS NOTES
Received discharge order. Reviewed AVS and reinforced stroke education with patient and wife at bedside. AVS signed, follow-up appointment made with neurologist and primary care, IV and tele removed. Pt taken to main entrance via wheelchair with all belongings to be driven home by wife.

## 2023-02-23 NOTE — CARE COORDINATION
Discharge order noted for today. Pt has been accepted to [facility] agency. Spoke with patient and he is agreeable to the transition plan today. Transport has been arranged through patient's wife. Patient's home health  orders have been forwarded to [facility] home health  agency via [address] Updated bedside RN, Evalee Prader,  to the transition plan.   Discharge information has been documented on the AVS.             Kyala Chopra RN  Case Management [phone]

## 2023-02-23 NOTE — CARE COORDINATION
02/23/23 0913   Service Assessment   Patient Orientation Alert and Oriented;Person;Place;Situation;Self   Cognition Alert   History Provided By Patient   Primary Caregiver Self   Support Systems Spouse/Significant Other   PCP Verified by CM Yes  (CM updated PCP in patient's chart. Dr. Ricky Wolfe [facility]   Prior Functional Level Independent in ADLs/IADLs   Current Functional Level Independent in ADLs/IADLs   Can patient return to prior living arrangement Yes   Ability to make needs known: Good   Family able to assist with home care needs: Yes   Social/Functional History   Lives With Spouse   Type of Home House   Entrance Stairs - Number of Steps No steps to enter the home. Home Equipment   (Patient does not use/have any DME in the home.)   Receives Help From Family   ADL Assistance Independent   Homemaking Assistance Independent   Ambulation Assistance Independent   Transfer Assistance Independent   Discharge Planning   Type of [facility] Spouse/Significant Other  (Patient lives with his wife.)   Current Services Prior To Admission None   Potential Assistance Needed Home Care   DME Ordered? No   Potential Assistance Purchasing Medications No   Type of Home Care Services PT;OT;Skilled Therapy   Patient expects to be discharged to: [facility]  (with [facility] Discharge   Transition of Care Consult (CM Consult) [address] [place] [address]   Mode of Transport at Discharge Self  (Patient's wife will be transporting patient home today at time of discharge.)   Confirm Follow Up Transport Self   Condition of Participation: Discharge Planning   The Patient and/or Patient Representative was provided with a Choice of Provider? Patient   The Patient and/Or Patient Representative agree with the Discharge Plan?  Yes   Freedom of Choice list was provided with basic dialogue that supports the patient's individualized plan of care/goals, treatment preferences, and shares the quality data associated with the providers?   Yes  ([address] verbal consent given for [facility].)

## 2023-02-23 NOTE — DISCHARGE SUMMARY
[facility]    [facility] Summary    Patient: Adrianne Diggs MRN: [id number]  [facility]: [id number]    YOB: 1950  Age: 74 y.o. Sex: male    DOA: 2/21/2023 LOS:  LOS: 1 day   Discharge Date: 2/23/2023         Discharge Diagnoses:     1 Acute pontine CVA   2 Hyperlipidemia     Discharge Condition: Good    Discharge To: Home    Consults: Neurology    HPI: per admitting MD \"       Hospital Course:   HPI : 26-year-old male past medical history of hyperlipidemia history of childhood stroke etiology or reason or history around that is not clear now being admitted after 2 days of not feeling well nausea vomiting which improved but is dysarthria and ataxia is persistent and MRI was done which suggested pontine stroke MRA is unremarkable    Patient declined ARU/SNF rehab , home PT OT   Patient discharged on High potency statin and  /day       Physical Exam:  General : stable   HEENT :NAD   RS:Clear   CVS:reg  Abd:soft   Neuro :Mild ataxia , some speech disturbance   EXT: stable     Significant Diagnostic Studies:     No results found for this or any previous visit (from the past 24 hour(s)). CT HEAD WO CONTRAST    Result Date: 2/21/2023  CT OF THE HEAD WITHOUT CONTRAST. CLINICAL HISTORY: Code stroke. Dizziness. Last known well at midnight. TECHNIQUE: Helical scan obtained of the head were obtained from the skull vertex through the base of the skull without intravenous contrast.    All CT scans are performed using dose optimization techniques as appropriate to the performed exam including the following: Automated exposure control, adjustment of mA and/or kV according to patient size, and use of iterative reconstructive technique. COMPARISON: None. FINDINGS: The sulcal pattern and ventricular system are normal in size and configuration for age. No intracranial hemorrhage. Bilateral periventricular and deep white matter hypodensities.  Small lacunar infarct at the left cerebellum suggestive on axial image 8-9. No mass effect. The visualized paranasal sinuses are clear. The mastoid air cells are clear. The visualized bony structures are unremarkable. Incidental dermal calcifications along the face and forehead. Subcutaneous right lateral scalp nodule measuring 13 mm on image 21. No acute hemorrhage. Age-indeterminate small lacunar infarct at the left cerebellum. Chronic microvascular disease. Small right lateral scalp subcutaneous nodule or complex cyst. Called to Dr. Brandy Ellis 2/21/2023 at 10:16 AM    US RETROPERITONEAL COMPLETE    Result Date: 2/21/2023  Complete retroperitoneal ultrasound. HISTORY: Acute kidney injury. Right kidney measures 11.1 cm in length. Slightly echogenic parenchyma. No hydronephrosis, renal mass or renal stones. Small cyst at 0.8 cm in the lower pole Left kidney 11.5 cm. Slightly echogenic. Irregular anechoic nodule, probably cysts in the lower pole. It measures 4.2 x 3.3 cm. Bladder is unremarkable. No urine jets seen. Echogenic renal parenchyma which can be compatible with medical renal disease. No hydronephrosis. Bilateral renal cysts    CTA HEAD NECK W CONTRAST    Result Date: 2/21/2023  EXAM: CTA HEAD NECK W CONTRAST CLINICAL INDICATIONS: Stroke rule out LVA Slurred speech and weakness TECHNIQUE: Helical CT scan of the brain and neck were performed during rapid IV bolus contrast administration. These data were reconstructed at .625 mm intervals for vascular analysis. The data was also reviewed at 2.5 mm intervals for accompanying soft tissue analysis. 3D post processed images, including surface shaded displays, were produced for this exam on independent console, permanently archived and interpreted.  All CT scans at this facility are performed using dose optimization technique as appropriate to a performed exam, to include automated exposure control, adjustment of the MA and/or kV according to patient size (including appropriate matching for site-specific examinations) or use of  iterative reconstruction technique. COMPARISON: Concurrent CT head CTA NECK VASCULAR ANALYSIS: Aortic arch: Left common carotid artery originates from the brachiocephalic. Proximal branch vessels are within normal limits. Right carotid: -CCA: Patent -ECA: Origin is patent -ICA: Patent. 0% stenosis of proximal ICA by NASCET criteria. Left carotid: -CCA: Patent -ECA: Origin is patent -ICA: Patent. 0% stenosis of proximal ICA by NASCET criteria. Right vertebral: Patent Left vertebral: Patent CTA HEAD VASCULAR ANALYSIS: Anterior circulation: -ICA: Patent bilaterally with mild atherosclerotic calcification. -MARIE: Patent -MCA: Patent -AComm: Diminutive but patent. -PComm: Hypoplastic bilaterally. Posterior circulation: -Vertebral: Patent -Basilar: Patent -Superior cerebellar: Patent -PCA: Patent Major dural venous sinuses: Unremarkable CTA SOFT TISSUE ANALYSIS: Lungs: Clear Upper chest: Unremarkable Neck: 1.5 x 1.2 cm hypodense right thyroid nodule. 1.6 x 0.8 cm enhancing nodule left carotid. Lymph nodes: No adenopathy Orbits: Unremarkable Paranasal sinuses: Mild mucosal thickening left maxillary sinus. Brain: No hemorrhage or mass effect. Bones: Degenerative disc disease without critical central canal stenosis. CTA HEAD: Patent intracranial vasculature without evidence of occlusion. CTA NECK: 1. Patent cervical carotid and vertebral arteries without hemodynamically significant stenosis. 2.  Right thyroid nodule measuring up to 1.5 cm, consider nonemergent outpatient follow-up ultrasound. 3.  Right carotid enhancing nodule, may represent primary parotid lesion including pleomorphic adenoma. Prelim results called to [address], 1103 a.m.      MRI BRAIN WO CONTRAST    Result Date: 2/22/2023  MRI BRAIN WITHOUT CONTRAST PROVIDED REASON FOR EXAM: code stroke Additional History: None Comparison Studies: CT head, CTA head and neck earlier same day, 2/21/2023 Imaging Technique:   Sequences:  Sagittal,  Coronal and axial T1 weighted, Diffusion Weighted (DWI), Axial T2 weighted, Axial T2 FLAIR, and SWI/GRE MRI images of the brain. Contrast Material:  NONE Limiting Factors/Major Artifacts: None. FINDINGS: Brain Parenchyma: On axial DWI image 11 there is a band of increased DWI signal in the right of midline central penny. The abnormal signal is seen on single axial DWI image only. However, there is corresponding hypointense ADC map signal and the findings are consistent with an acute pontine infarct. No other findings of acute infarction. Small chronic left cerebellar infarct. T2 FLAIR hyperintense signal changes in the periventricular white matter are consistent with chronic microvascular ischemic changes. No visible masses or midline shift. CSF Spaces:  Normal in size and morphology for the patient's age. No hydrocephalus. Vascular System:  Grossly patent flow in basilar and internal cerebral arteries. No findings of dural sinus thrombosis. Hemorrhage:  No acute hemorrhage. No chronic microhemorrhage. Other Structures: Calvarium: No suspicious marrow signal. Sella: Pituitary is not enlarged. Visualized Upper Cervical Spine: Normal. Orbits: Normal for non-dedicated exam. Paranasal Sinuses: No significant paranasal sinus disease. Mastoid Air Cells:  Clear. 1. Acute pontine infarct -Diffusion sequence shows somewhat focus of abnormal hyperintense and restricted diffusion in the right of midline central penny. More linear than typical central pontine infarct, but does not appear to be artifact. 2. Chronic microvascular ischemic changes in the cerebral white matter. 3. Small chronic left cerebellar infarct. Transthoracic echocardiogram (TTE) complete with contrast, bubble, strain, and 3D PRN    Result Date: 2/21/2023    Left Ventricle: Normal left ventricular systolic function with a visually estimated EF of 60 - 65%. Left ventricle is smaller than normal. LVIDd is 3.8 cm. Moderate septal thickening. Normal wall motion. Normal diastolic function. Interatrial Septum: No obvious interatrial shunt visualized with color Doppler. Agitated saline study appears negative with and without provocation. Pulmonic Valve: Mild regurgitation. Tricuspid Valve: Normal RVSP. The estimated RVSP is 21 mmHg. Aorta: Dilated aortic root. Ao root diameter is 3.9 cm. Dilated ascending aorta. Ao ascending diameter is 3.8 cm. Dilated aortic arch. Ao arch diameter is 3.3 cm. Descending aorta is not well-visualized. Pericardium: Left pleural effusion.        Procedures: none     Discharge Medications:        Medication List        START taking these medications      aspirin 325 MG tablet  Take 1 tablet by mouth daily     atorvastatin 80 MG tablet  Commonly known as: LIPITOR  Take 1 tablet by mouth nightly     famotidine 20 MG tablet  Commonly known as: PEPCID  Take 1 tablet by mouth nightly     polyethylene glycol 17 g packet  Commonly known as: GLYCOLAX  Take 17 g by mouth daily as needed for Constipation            CONTINUE taking these medications      albuterol sulfate  (90 Base) MCG/ACT inhaler  Commonly known as: PROVENTIL;VENTOLIN;PROAIR     Symbicort 160-4.5 MCG/ACT Aero  Generic drug: budesonide-formoterol            STOP taking these medications      buPROPion 150 MG extended release tablet  Commonly known as: WELLBUTRIN SR     rosuvastatin 20 MG tablet  Commonly known as: CRESTOR               Where to Get Your Medications        [facility] [place] [address] [address]  [address] [place] [address] [address] [address]      Phone: [phone]   aspirin 325 MG tablet  atorvastatin 80 MG tablet  famotidine 20 MG tablet  polyethylene glycol 17 g packet         Activity: activity as tolerated    Diet: cardiac diet        Discharge time: >40 minutes spent coordinating this discharge    Dionicio Louise MD 2/25/2023, 9:58 AM

## 2023-02-24 ENCOUNTER — HOME CARE VISIT (OUTPATIENT)
Age: 73
End: 2023-02-24

## 2023-02-24 VITALS
TEMPERATURE: 98 F | HEART RATE: 80 BPM | DIASTOLIC BLOOD PRESSURE: 80 MMHG | RESPIRATION RATE: 18 BRPM | SYSTOLIC BLOOD PRESSURE: 140 MMHG | OXYGEN SATURATION: 99 %

## 2023-02-24 VITALS
TEMPERATURE: 97.9 F | HEART RATE: 63 BPM | RESPIRATION RATE: 17 BRPM | SYSTOLIC BLOOD PRESSURE: 138 MMHG | OXYGEN SATURATION: 97 % | DIASTOLIC BLOOD PRESSURE: 78 MMHG

## 2023-02-24 PROCEDURE — 0221000100 HH NO PAY CLAIM PROCEDURE

## 2023-02-24 PROCEDURE — G0151 HHCP-SERV OF PT,EA 15 MIN: HCPCS

## 2023-02-24 PROCEDURE — G0299 HHS/HOSPICE OF RN EA 15 MIN: HCPCS

## 2023-02-24 ASSESSMENT — ENCOUNTER SYMPTOMS
PAIN LOCATION - PAIN QUALITY: ACHY
DYSPNEA ACTIVITY LEVEL: AFTER AMBULATING MORE THAN 20 FT

## 2023-02-24 NOTE — Clinical Note
Dear Lukasz Wilson ,     This message is to inform you that your patient, ISHMAEL Simon,  46,  has been evaluated by [facility] services under Home PT today. It was determined that pt will benefit from Home PT for 1w1, 2w3,   to improve strength and endurance, gait, balance, safety on stairs, and return to PLOF. Pt also has no need for skilled OT at this time and will be DC. Any questions please contact me at [phone].     Sincerely,     Yulissa Lynn, PT

## 2023-02-24 NOTE — Clinical Note
Annetta,     You will be seeing this pt for treatment. Admitted with CVA, balance and gait issues. Pt strength good- . Please focus mostly on balance, coordination, endurance, outside ambulation and uneven surfaces    Please focus on:   1. Give standing HEP but focus on performing with no UE support for balance   2. safety with transfers off all surfaces  3. gait training in home and progress off FWW to cane,  and up and down flight of  stairs with rail   4. balance - high level challenges   5. return to PLOF or maximize indep.      Any questions give me a call  Rocio Morales

## 2023-02-24 NOTE — Clinical Note
Judson admitted to [facility] today for acute cva, anticipated sn freq: 1w4 with pt/ot for eval and treat.

## 2023-02-24 NOTE — HOME HEALTH
Evaluation Summary: Mr. Deandre Quijano is a  74year old male being admitted to home health for PT/SN/OT services for CVA. Pt does not need OT services at this time and will be DC. Dr. Sonny Lloyd made aware. The patient's caregiver/representative present, & able and willing to provide care daily (wife). Patient participated in goal setting and care planning and are agreeable to the care plan. Discharge planning/training was initiated to maximize overall function and return to PLOF. PMHx:    Cerebral artery occlusion with cerebral infarction ([facility] [address]    H/O prostate cancer    Hyperlipidemia    Moderate persistent asthma    Nephrolithiasis    PTSD (post-traumatic stress disorder)     SUBJECTIVE: \"I am feeling pretty good. I fel strong, its more my walking and balance. \"   Pt denies pain. Pt denies falls. REQUIRES CAREGIVER ASSISTANCE FOR: family assists with meals, ADLs,  transportation, medications, transfers, bed mob  PLOF: Pt lives with wife in 2 level home with no steps to enter and has master bedroom on first floor. Does not need to go upstairs. Pt was indep with ambulation with cane at times, indep with ADLs, home tasks, and medications. Pt was also driving   DME: FWW, cane,  w/c  MEDICATIONS REVIEWED AND UPDATED: Medications reconciled and all medications are available in the home this visit. The following education was provided regarding medications   medication interactions, and look a like medications: Continue medication as prescribed by MD. Medications are effective at this time. NEXT MD APPT:  TBD   ROM:  WNL  STRENGTH:  see strength section for details. WOUNDS:none reported or observed see nursing notes  BED MOBILITY: sit <> sup  indep   TRANSFERS: sit to stand from couch,   bed and toilet with SBA but cues for proper hand placement for safety and proper technique. GAIT: pt ambulated 21' x2, 30' x2 with FWW and SBA with cues on proper management of walker, staying close to walker.  Pt with steppage gait noted and some discoordination noted.  No LOB but pt unsteady and reports different from his baseline.    STAIRS: up and down 14 steps with manas rails and SBA.  Step over step gait pattern.   BALANCE: Pt scored 23/28 on Tinetti Balance Assessment placing pt at moderate risk for falls.   PATIENT EDUCATION PROVIDED THIS VISIT: safety for transfers and hand placement,  walking with FWW and closer to FWW,  deep breathing/PLB,    clearing obstacles and clear pathways to prevent falls as well as proper lighting at night time    Patient Level of understanding of education provided: pt agreeable to POT for Home PT  Patient response to treatment: Pt reported no increase in pain throughout treatment.   CONTINUED NEED FOR THE FOLLOWING SKILLS: HH PT is medically necessary to address pain,  decreased strength and endurance, decreased independence with functional transfers, impaired gait, impaired stair negotiation, and impaired balance in order to improve functional independence, quality of life, return to PLOF, and reduce the risk for falls.  PLAN:   1w1, 2w3.  DISCHARGE PLANNING DISCUSSED: Discharge to self and family under MD supervision once all goals have been met or patient has reached max potential.

## 2023-02-24 NOTE — Clinical Note
Augie Taylor ,    PT richard done today and will  for 1w1, 2w3. And DC OT.       Therapy Functional Score Assessment  Question   Score   Grooming    2   Upper Dressing 2     Lower Dressing 2      Bathing  3      Toilet Transfer  1   Transfer  2            Ambulation  3   Dyspnea                     0       Pain Interfering with activity 0  Est number therapy visits      7    TaraG     Response codes - delete these before sending message   Grooming          0 = Groom Unaided, with or without devices independently   1 = Grooming utensils must be placed in reach- includes access to items or location  2 = Needs Assist- includes cues or reminders  3 = Dependent in grooming     Upper Dressing   Item includes prosthetics, orthotics, braces, and support devices      0 = Can get clothes, don and doff, without assistance  1 = Can dress independently if clothing laid out or handed to patient- includes access to location   2 = Needs help to don/doff upper body clothing- includes cues or reminders  3 = Dependent in upper body clothing- includes inability to SAFELY participate in dressing       Lower Dressing   Item includes prosthetics, orthotics, braces, and support devices      0 = Can get clothes, don and doff clothing and shoes independently  1 = Can dress independently if clothing/shoes laid out or handed to patient- includes access to location  2 = Needs help to don/doff lower body clothing- includes cues or reminders  3 = Dependent in lower body clothing- includes inability to SAFELY participate in dressing     Bathing           0 = Independent in shower or tub including getting in and out of tub/shower  1 = Independent in shower or tub with use of devices  2 = Needs intermittent assistance/supervision in shower or tub- SAFELY  3 = Needs continuous assistance/supervision in shower or tub- SAFELY  4 = Independent sink bather with or without devices- must be able to access sink/basin independently  5 = Needs assist with sink bathing- intermittent OR continuous  6 = Dependent for bathing- includes inability to SAFELY participate in bathing     Toilet Transfer     0 = Independent in getting to and from; on and off toilet with or without a device  1 = When reminded, assisted or supervised able to get to/from toilet and transfer- SAFELY.   2 = Unable to get to and from toilet but is able to use a BSC w/ or w/o assistance- must have a BSC in the home  3 = Unable to get to and from toilet or BSC but is able to use bedpan/urinal independently- must have a bedpan/urinal in the home  4 = Totally dependent in toileting- dependent OR does not meet criteria for responses 2 or 3 OR equipment not present in the home     Transfer       0 = Independent Transfer  1 = Minimal Assist OR use of an Assistive Device- assist includes verbal cueing, environmental set-up, and/or hands on assist   2 = Must be able to bear weight AND pivot, needs Minimal Assist AND use of an Assistive Device OR needs more than 25% assistance  3 = Unable to transfer self, unable to bear weight OR pivot OR unable to bear weight AND pivot but is still transferring out of bed  4 = Bedfast but CAN turn/position self in bed  5 = Bedfast and CANNOT turn/position self in bed    1860 Ambulation            0 = Independent no human assist and no device on any surface and stairs with/without rail  1 = Independent with one handed device on any surface and stairs with/without rail   2 = Requires 2 handed device to be independent on level surfaces and/or requires human supervision or assistance on stairs/steps or uneven surfaces  3 = Only with supervision or assistance at all times- SAFELY  4 = Chairfast, but able to wheel self independently- must have a wheelchair to score independent   5 = Chairfast, unable to wheel self- appropriate response if unsafe to ambulate or if patient can only take a few steps to transfer, may not own a wheelchair  6 = Bedfast, medical restrictions, unable to ambulate or be up in a chair     Dyspnea                       0 = Not SOB  1 = SOB when walking 20 ft or climbing stairs  2 =  SOB w/moderate exertion - while dressing, using commode or bedpan, walking less than 20 feet  3 = SOB w/Minimal Exertion - while eating, talking, or performing other  ADLs or with agitation  4 = SOB At rest, lying down

## 2023-02-24 NOTE — HOME HEALTH
Skilled services/Home bound verification:     Skilled Reason for admission/summary of clinical condition:  acute cva . This patient is homebound for the following reasons Requires the assistance of 1 or more persons to leave the home . Caregiver: spouse. Caregiver assists with family assists ADL's, medications, meals, transportation, errands. Medications reconciled and all medications are not available in the home this visit. The following education was provided regarding medications: Instructed patient/caregiver on general precautions while taking aspirin: take with food, milk, or large glass of water to decrease gastric symptoms. (Enteric coated or buffered may be better tolerated.); avoid alcohol due to possible internal bleeding; only take the recommended amount; use cautiously with Asthma; observe and report s/s of bleeding (easy bruising, bleeding gums, black stools); discard medications if vinegar odor is present; do not take antacids due to decreased effectiveness; avoid chewing or crushing enteric coated. Medications  are effective at this time. High risk medication teaching regarding anticoagulants, hyperglycemic agents or opiod narcotics performed (specify) na,    notified of any discrepancies/look a like medications/medication interactions na. Home health supplies by type and quantity ordered/delivered this visit include: na    Patient education provided this visit to include:       Patient level of understanding of education provided: patient/caregiver verbalized 100% understanding. Carissa Education Provided: blake  Patient response to procedure performed:  patient denied pain and discomfort during procedure/visit  Home exercise program/Homework provided: Sn instructed patient on pursed lip breathing. Pursed lip breathing is one of the simplest ways to control shortness of breath. It provides a quick and easy way to slow your pace of breathing, making each breath more effective. Pursed lip breathing: Improves ventilation, releases trapped air in the lungs, keeps the airways open longer and decreases the work of breathing, prolongs exhalation to slow the breathing rate, improves breathing patterns by moving old air out of the lungs and allowing for new air to enter the lungs, relieves shortness of breath, causes general relaxation. When should I use this technique? Use this technique during the difficult part of any activity, such as bending, lifting or stair climbing. Practice this technique 4 - 5 times a day at first so you can get the correct breathing pattern. Pursed lip breathing technique: Relax your neck and shoulder muscles, breathe in ( inhale ) slowly through your nose for two counts, keeping your mouth closed. Don't take a deep breath; a normal breath will do. It may help to count to yourself: inhale, one, two. Pucker or purse your lips as if you were going to whistle or gently flicker the flame of a candle. Breathe out ( exhale ) slowly and gently through your pursed lips while counting to four. It may help to count to yourself: exhale, one, two, three, four. Pt/Caregiver instructed on plan of care and are agreeable to plan of care at this time. Physician Dr. Dariela Huynh notified of patient admission to home health and plan of care including anticipated frequency of 1w4 and treatments/interventions/modalities of pt/ot. Discharge planning discussed with patient and caregiver. Discharge planning as follows: when goals met, when wounds healed, pain controlled and family independent with medications. Pt/Caregiver did verbalize understanding of discharge planning. Next MD appointment Dr. Dariela Huynh on 3/2/23. Patient/caregiver encouraged/instructed to keep appointment as lack of follow through with physician appointment could result in discontinuation of home care services for non-compliance.

## 2023-02-28 ENCOUNTER — HOME CARE VISIT (OUTPATIENT)
Age: 73
End: 2023-02-28

## 2023-03-01 ENCOUNTER — HOME CARE VISIT (OUTPATIENT)
Age: 73
End: 2023-03-01

## 2023-03-01 VITALS
HEART RATE: 65 BPM | SYSTOLIC BLOOD PRESSURE: 119 MMHG | TEMPERATURE: 97.8 F | DIASTOLIC BLOOD PRESSURE: 71 MMHG | RESPIRATION RATE: 18 BRPM | OXYGEN SATURATION: 98 %

## 2023-03-01 PROCEDURE — G0157 HHC PT ASSISTANT EA 15: HCPCS

## 2023-03-02 NOTE — HOME HEALTH
SUBJECTIVE: Patient answers the door without use of AD. Patient reports he has some tightness, soreness present in L thigh. Patient denies any recent falls or medication changes. CAREGIVER INVOLVEMENT/ASSISTANCE NEEDED FOR: Patient lives in a two story home with his wife who is assisting with meal preparations but does report he is able to perform ADLs on his own. OBJECTIVE:  See interventions. PATIENT RESPONSE TO TREATMENT: Patient was very motivated with PT session today and has no pain present. He has minimal fatigue noted. PATIENT LEVEL OF UNDERSTANDING OF EDUCATION PROVIDED: Patient was instructed on importance of using AD at all times with standing to decrease risk for tripping/falls and to improve patient safety. ASSESSMENT OF PROGRESS TOWARD GOALS: Patient was seen today for a PT follow up and patient was able to perform initial therapy tasks without increase in pain but does require minimal rest breaks for general fatigue. He was able to perform going up and down stairs in home with SBA approaching supervision with reciprocal steps. Patient then performed gait training indoors x150 feet without use of AD- patient is educated to increase B heel strike to aide with LE clearance. Patient was issued initial HEP as below- he reports understanding and compliance. HOME EXERCISE PROGRAM:Patient was instructed on taking a walk at least once per hour to increase time spent in standing. Patient was educated on sitting BLE therex with AROM as follows: sitting- LAQ, knee flexion, hip marching, ankle pumps, hip abduction with LE extended x10-15 reps each. THE FOLLOWING DISCHARGE PLANNING WAS DISCUSSED WITH THE PATIENT/CAREGIVER: DC when PT goals are met. PLAN NEXT VISIT: Plan in coming visit to increase gait indoors/outdoors and perform TINETTI.

## 2023-03-03 ENCOUNTER — HOME CARE VISIT (OUTPATIENT)
Age: 73
End: 2023-03-03

## 2023-03-03 ENCOUNTER — HOME CARE VISIT (OUTPATIENT)
Age: 73
End: 2023-03-03
Payer: MEDICARE

## 2023-03-03 PROCEDURE — G0157 HHC PT ASSISTANT EA 15: HCPCS

## 2023-03-04 VITALS
DIASTOLIC BLOOD PRESSURE: 82 MMHG | TEMPERATURE: 97.9 F | SYSTOLIC BLOOD PRESSURE: 161 MMHG | RESPIRATION RATE: 18 BRPM | HEART RATE: 64 BPM | OXYGEN SATURATION: 98 %

## 2023-03-04 NOTE — HOME HEALTH
SUBJECTIVE: Patient reports no pain and \"I am doing good\". Patient denies any recent falls or medication changes. CAREGIVER INVOLVEMENT/ASSISTANCE NEEDED FOR: Patient lives in a two story home with his wife who is assisting with meal preparations but does report he is able to perform ADLs on his own. OBJECTIVE:  See interventions. PATIENT RESPONSE TO TREATMENT: Patient was very motivated with PT session today and has no pain present. He has minimal fatigue noted. PATIENT LEVEL OF UNDERSTANDING OF EDUCATION PROVIDED: Patient was instructed on importance of using AD at all times with standing to decrease risk for tripping/falls and to improve patient safety. .Assessment and Summary of Care:  Patient's current functional status before discharge is as follows  Strength: Please assess at DC  ROM:  BLEs are WNLs  Bed Mobility: Patient is (I) with all aspects of bed mobility  Transfers: Patient is (I) with all household transfers and NO UE support needed on den chair. Plan to perform car transfer next visit. Gait/WC mobility: Patient was unable to perform outdoor gait training but plans to perform next visit as it has rained this week on both visits. Patient is performing gait indoors x200 + feet without use of AD and with (I). Stairs: Patient is (I) with stair negotiations (up and down 1 flight) with reciprocal steps and UE support on handrail as needed. Special Tests: TINETTI is 28/28  Recommendations: Plan to perform an early DC as patient has met goals. HOME EXERCISE PROGRAM:Patient was instructed on taking a walk at least once per hour to increase time spent in standing. Patient was educated on sitting BLE therex with AROM as follows: sitting- LAQ, knee flexion, hip marching, ankle pumps, hip abduction with LE extended, in standing: HR, TR, hip marching, hip abduction x10-15 reps each.

## 2023-03-06 ENCOUNTER — HOME CARE VISIT (OUTPATIENT)
Age: 73
End: 2023-03-06
Payer: MEDICARE

## 2023-03-06 VITALS
RESPIRATION RATE: 16 BRPM | SYSTOLIC BLOOD PRESSURE: 152 MMHG | DIASTOLIC BLOOD PRESSURE: 64 MMHG | OXYGEN SATURATION: 99 % | HEART RATE: 59 BPM | TEMPERATURE: 97.9 F

## 2023-03-06 PROCEDURE — G0151 HHCP-SERV OF PT,EA 15 MIN: HCPCS

## 2023-03-06 ASSESSMENT — ENCOUNTER SYMPTOMS: PAIN LOCATION - PAIN QUALITY: ACHY

## 2023-03-07 NOTE — CASE COMMUNICATION
SUBJECTIVE: Patient reports no pain and \"I am doing good\". Patient denies any recent falls or medication changes. CAREGIVER INVOLVEMENT/ASSISTANCE NEEDED FOR: Patient lives in a two story home with his wife who is assisting with meal preparations but does report he is able to perform ADLs on his own. OBJECTIVE:  See interventions. PATIENT RESPONSE TO TREATMENT: Patient was very motivated with PT session today and has no pain p resent. He has minimal fatigue noted. PATIENT LEVEL OF UNDERSTANDING OF EDUCATION PROVIDED: Patient was instructed on importance of using AD at all times with standing to decrease risk for tripping/falls and to improve patient safety. .Assessment and Summary of Care:  Patient's current functional status before discharge is as follows  Strength: Please assess at DC  ROM:  BLEs are WNLs  Bed Mobility: Patient is (I) with all aspects  of bed mobility  Transfers: Patient is (I) with all household transfers and NO UE support needed on den chair. Plan to perform car transfer next visit. Gait/WC mobility: Patient was unable to perform outdoor gait training but plans to perform next visit as it has rained this week on both visits. Patient is performing gait indoors x200 + feet without use of AD and with (I). Stairs: Patient is (I) with stair negotiations (up and down  1 flight) with reciprocal steps and UE support on handrail as needed. Special Tests: TINETTI is 28/28  Recommendations: Plan to perform an early DC as patient has met goals. HOME EXERCISE PROGRAM:Patient was instructed on taking a walk at least once per hour to increase time spent in standing. Patient was educated on sitting BLE therex with AROM as follows: sitting- LAQ, knee flexion, hip marching, ankle pumps, hip abduction with  LE extended, in standing: HR, TR, hip marching, hip abduction x10-15 reps each.

## 2023-05-31 ENCOUNTER — OFFICE VISIT (OUTPATIENT)
Age: 73
End: 2023-05-31
Payer: MEDICARE

## 2023-05-31 DIAGNOSIS — I63.50 RIGHT PONTINE STROKE (HCC): ICD-10-CM

## 2023-05-31 DIAGNOSIS — E07.9 THYROID LESION: ICD-10-CM

## 2023-05-31 DIAGNOSIS — K11.9 LESION OF PAROTID GLAND: ICD-10-CM

## 2023-05-31 PROCEDURE — G8427 DOCREV CUR MEDS BY ELIG CLIN: HCPCS | Performed by: NURSE PRACTITIONER

## 2023-05-31 PROCEDURE — 1123F ACP DISCUSS/DSCN MKR DOCD: CPT | Performed by: NURSE PRACTITIONER

## 2023-05-31 PROCEDURE — 1036F TOBACCO NON-USER: CPT | Performed by: NURSE PRACTITIONER

## 2023-05-31 PROCEDURE — 3017F COLORECTAL CA SCREEN DOC REV: CPT | Performed by: NURSE PRACTITIONER

## 2023-05-31 PROCEDURE — G8417 CALC BMI ABV UP PARAM F/U: HCPCS | Performed by: NURSE PRACTITIONER

## 2023-05-31 PROCEDURE — 99213 OFFICE O/P EST LOW 20 MIN: CPT | Performed by: NURSE PRACTITIONER

## 2023-06-01 VITALS
RESPIRATION RATE: 16 BRPM | WEIGHT: 193 LBS | OXYGEN SATURATION: 98 % | HEIGHT: 69 IN | DIASTOLIC BLOOD PRESSURE: 88 MMHG | SYSTOLIC BLOOD PRESSURE: 158 MMHG | BODY MASS INDEX: 28.58 KG/M2 | HEART RATE: 63 BPM

## 2023-06-06 ENCOUNTER — HOSPITAL ENCOUNTER (OUTPATIENT)
Facility: HOSPITAL | Age: 73
Discharge: HOME | End: 2023-06-09
Payer: MEDICARE

## 2023-06-06 LAB
T4 FREE SERPL-MCNC: 1.3 NG/DL (ref 0.7–1.5)
TSH SERPL DL<=0.05 MIU/L-ACNC: 0.62 UIU/ML (ref 0.36–3.74)

## 2023-06-06 PROCEDURE — 84443 ASSAY THYROID STIM HORMONE: CPT

## 2023-06-06 PROCEDURE — 84439 ASSAY OF FREE THYROXINE: CPT

## 2023-06-06 PROCEDURE — 36415 COLL VENOUS BLD VENIPUNCTURE: CPT
